# Patient Record
Sex: MALE | Race: BLACK OR AFRICAN AMERICAN | NOT HISPANIC OR LATINO | URBAN - METROPOLITAN AREA
[De-identification: names, ages, dates, MRNs, and addresses within clinical notes are randomized per-mention and may not be internally consistent; named-entity substitution may affect disease eponyms.]

---

## 2021-01-18 ENCOUNTER — HOSPITAL ENCOUNTER (EMERGENCY)
Facility: HOSPITAL | Age: 66
Discharge: HOME | End: 2021-01-18
Attending: EMERGENCY MEDICINE

## 2021-01-18 VITALS
WEIGHT: 160 LBS | BODY MASS INDEX: 22.9 KG/M2 | HEIGHT: 70 IN | OXYGEN SATURATION: 100 % | RESPIRATION RATE: 19 BRPM | DIASTOLIC BLOOD PRESSURE: 97 MMHG | TEMPERATURE: 98 F | SYSTOLIC BLOOD PRESSURE: 188 MMHG | HEART RATE: 61 BPM

## 2021-01-18 DIAGNOSIS — I10 UNCONTROLLED HYPERTENSION: ICD-10-CM

## 2021-01-18 DIAGNOSIS — S29.011A MUSCLE STRAIN OF CHEST WALL, INITIAL ENCOUNTER: Primary | ICD-10-CM

## 2021-01-18 DIAGNOSIS — R07.81 RIB PAIN ON LEFT SIDE: ICD-10-CM

## 2021-01-18 LAB
ALBUMIN SERPL BCP-MCNC: 4.1 G/DL (ref 3.5–5.2)
ALP SERPL-CCNC: 65 U/L (ref 38–126)
ALT SERPL W/O P-5'-P-CCNC: 23 U/L (ref 10–44)
ANION GAP SERPL CALC-SCNC: 7 MMOL/L (ref 8–16)
AST SERPL-CCNC: 32 U/L (ref 15–46)
BASOPHILS # BLD AUTO: 0.02 K/UL (ref 0–0.2)
BASOPHILS NFR BLD: 0.4 % (ref 0–1.9)
BILIRUB SERPL-MCNC: 0.5 MG/DL (ref 0.1–1)
CALCIUM SERPL-MCNC: 9.3 MG/DL (ref 8.7–10.5)
CHLORIDE SERPL-SCNC: 102 MMOL/L (ref 95–110)
CO2 SERPL-SCNC: 33 MMOL/L (ref 23–29)
CREAT SERPL-MCNC: 1.03 MG/DL (ref 0.5–1.4)
DIFFERENTIAL METHOD: ABNORMAL
EOSINOPHIL # BLD AUTO: 0 K/UL (ref 0–0.5)
EOSINOPHIL NFR BLD: 0.9 % (ref 0–8)
ERYTHROCYTE [DISTWIDTH] IN BLOOD BY AUTOMATED COUNT: 14.1 % (ref 11.5–14.5)
EST. GFR  (AFRICAN AMERICAN): >60 ML/MIN/1.73 M^2
EST. GFR  (NON AFRICAN AMERICAN): >60 ML/MIN/1.73 M^2
GLUCOSE SERPL-MCNC: 89 MG/DL (ref 70–110)
HCT VFR BLD AUTO: 42.6 % (ref 40–54)
HGB BLD-MCNC: 13.6 G/DL (ref 14–18)
IMM GRANULOCYTES # BLD AUTO: 0.01 K/UL (ref 0–0.04)
IMM GRANULOCYTES NFR BLD AUTO: 0.2 % (ref 0–0.5)
LYMPHOCYTES # BLD AUTO: 1.8 K/UL (ref 1–4.8)
LYMPHOCYTES NFR BLD: 39.5 % (ref 18–48)
MCH RBC QN AUTO: 27.5 PG (ref 27–31)
MCHC RBC AUTO-ENTMCNC: 31.9 G/DL (ref 32–36)
MCV RBC AUTO: 86 FL (ref 82–98)
MONOCYTES # BLD AUTO: 0.4 K/UL (ref 0.3–1)
MONOCYTES NFR BLD: 8.6 % (ref 4–15)
NEUTROPHILS # BLD AUTO: 2.3 K/UL (ref 1.8–7.7)
NEUTROPHILS NFR BLD: 50.4 % (ref 38–73)
NRBC BLD-RTO: 0 /100 WBC
PLATELET # BLD AUTO: 255 K/UL (ref 150–350)
PMV BLD AUTO: 8.9 FL (ref 9.2–12.9)
POTASSIUM SERPL-SCNC: 3.9 MMOL/L (ref 3.5–5.1)
PROT SERPL-MCNC: 7.5 G/DL (ref 6–8.4)
RBC # BLD AUTO: 4.95 M/UL (ref 4.6–6.2)
SODIUM SERPL-SCNC: 142 MMOL/L (ref 136–145)
TROPONIN I SERPL-MCNC: <0.012 NG/ML (ref 0.01–0.03)
UUN UR-MCNC: 11 MG/DL (ref 2–20)
WBC # BLD AUTO: 4.53 K/UL (ref 3.9–12.7)

## 2021-01-18 PROCEDURE — 84484 ASSAY OF TROPONIN QUANT: CPT | Mod: ER

## 2021-01-18 PROCEDURE — 85025 COMPLETE CBC W/AUTO DIFF WBC: CPT | Mod: ER

## 2021-01-18 PROCEDURE — 80053 COMPREHEN METABOLIC PANEL: CPT | Mod: ER

## 2021-01-18 PROCEDURE — 93010 EKG 12-LEAD: ICD-10-PCS | Mod: ,,, | Performed by: INTERNAL MEDICINE

## 2021-01-18 PROCEDURE — 25000003 PHARM REV CODE 250: Mod: ER | Performed by: PHYSICIAN ASSISTANT

## 2021-01-18 PROCEDURE — 36000 PLACE NEEDLE IN VEIN: CPT | Mod: ER

## 2021-01-18 PROCEDURE — 99285 EMERGENCY DEPT VISIT HI MDM: CPT | Mod: 25,ER

## 2021-01-18 PROCEDURE — 93010 ELECTROCARDIOGRAM REPORT: CPT | Mod: ,,, | Performed by: INTERNAL MEDICINE

## 2021-01-18 PROCEDURE — 93005 ELECTROCARDIOGRAM TRACING: CPT | Mod: ER

## 2021-01-18 RX ORDER — LISINOPRIL 5 MG/1
10 TABLET ORAL
Status: COMPLETED | OUTPATIENT
Start: 2021-01-18 | End: 2021-01-18

## 2021-01-18 RX ORDER — LISINOPRIL 10 MG/1
10 TABLET ORAL DAILY
Status: ON HOLD | COMMUNITY
End: 2022-09-07 | Stop reason: HOSPADM

## 2021-01-18 RX ADMIN — LISINOPRIL 10 MG: 5 TABLET ORAL at 06:01

## 2021-01-21 ENCOUNTER — TELEPHONE (OUTPATIENT)
Dept: ADMINISTRATIVE | Facility: OTHER | Age: 66
End: 2021-01-21

## 2022-09-05 ENCOUNTER — HOSPITAL ENCOUNTER (INPATIENT)
Facility: HOSPITAL | Age: 67
LOS: 1 days | Discharge: HOME | DRG: 246 | End: 2022-09-07
Attending: EMERGENCY MEDICINE | Admitting: FAMILY MEDICINE
Payer: MEDICARE

## 2022-09-05 DIAGNOSIS — R79.89 ELEVATED TROPONIN: ICD-10-CM

## 2022-09-05 DIAGNOSIS — I21.4 NSTEMI (NON-ST ELEVATED MYOCARDIAL INFARCTION): Primary | ICD-10-CM

## 2022-09-05 DIAGNOSIS — E78.2 MIXED HYPERLIPIDEMIA: ICD-10-CM

## 2022-09-05 DIAGNOSIS — I21.11 ST ELEVATION MYOCARDIAL INFARCTION INVOLVING RIGHT CORONARY ARTERY: ICD-10-CM

## 2022-09-05 DIAGNOSIS — I25.10 CAD (CORONARY ARTERY DISEASE): ICD-10-CM

## 2022-09-05 DIAGNOSIS — I21.3 STEMI (ST ELEVATION MYOCARDIAL INFARCTION): ICD-10-CM

## 2022-09-05 DIAGNOSIS — N18.30 CHRONIC RENAL FAILURE, STAGE 3 (MODERATE), UNSPECIFIED WHETHER STAGE 3A OR 3B CKD: ICD-10-CM

## 2022-09-05 DIAGNOSIS — I10 PRIMARY HYPERTENSION: ICD-10-CM

## 2022-09-05 DIAGNOSIS — R11.10 VOMITING: ICD-10-CM

## 2022-09-05 DIAGNOSIS — I25.10 CORONARY ARTERY DISEASE, UNSPECIFIED VESSEL OR LESION TYPE, UNSPECIFIED WHETHER ANGINA PRESENT, UNSPECIFIED WHETHER NATIVE OR TRANSPLANTED HEART: ICD-10-CM

## 2022-09-05 DIAGNOSIS — I24.9 ACUTE CORONARY SYNDROME: ICD-10-CM

## 2022-09-05 DIAGNOSIS — R11.10 VOMITING, INTRACTABILITY OF VOMITING NOT SPECIFIED, PRESENCE OF NAUSEA NOT SPECIFIED, UNSPECIFIED VOMITING TYPE: ICD-10-CM

## 2022-09-05 DIAGNOSIS — I21.9: ICD-10-CM

## 2022-09-05 PROCEDURE — 96361 HYDRATE IV INFUSION ADD-ON: CPT | Mod: ER

## 2022-09-05 PROCEDURE — 85025 COMPLETE CBC W/AUTO DIFF WBC: CPT | Mod: ER | Performed by: EMERGENCY MEDICINE

## 2022-09-05 PROCEDURE — 25000003 PHARM REV CODE 250: Mod: ER | Performed by: EMERGENCY MEDICINE

## 2022-09-05 PROCEDURE — 63600175 PHARM REV CODE 636 W HCPCS: Mod: ER | Performed by: EMERGENCY MEDICINE

## 2022-09-05 PROCEDURE — 99291 CRITICAL CARE FIRST HOUR: CPT | Mod: 25,ER

## 2022-09-05 PROCEDURE — 84484 ASSAY OF TROPONIN QUANT: CPT | Mod: ER | Performed by: EMERGENCY MEDICINE

## 2022-09-05 PROCEDURE — 85610 PROTHROMBIN TIME: CPT | Mod: ER | Performed by: EMERGENCY MEDICINE

## 2022-09-05 PROCEDURE — 80053 COMPREHEN METABOLIC PANEL: CPT | Mod: ER | Performed by: EMERGENCY MEDICINE

## 2022-09-05 PROCEDURE — 85730 THROMBOPLASTIN TIME PARTIAL: CPT | Mod: ER | Performed by: EMERGENCY MEDICINE

## 2022-09-05 PROCEDURE — 83690 ASSAY OF LIPASE: CPT | Mod: ER | Performed by: EMERGENCY MEDICINE

## 2022-09-05 PROCEDURE — 96375 TX/PRO/DX INJ NEW DRUG ADDON: CPT | Mod: ER

## 2022-09-05 RX ORDER — ONDANSETRON 2 MG/ML
4 INJECTION INTRAMUSCULAR; INTRAVENOUS
Status: COMPLETED | OUTPATIENT
Start: 2022-09-05 | End: 2022-09-05

## 2022-09-05 RX ORDER — KETOROLAC TROMETHAMINE 30 MG/ML
15 INJECTION, SOLUTION INTRAMUSCULAR; INTRAVENOUS
Status: COMPLETED | OUTPATIENT
Start: 2022-09-05 | End: 2022-09-05

## 2022-09-05 RX ADMIN — KETOROLAC TROMETHAMINE 15 MG: 30 INJECTION, SOLUTION INTRAMUSCULAR; INTRAVENOUS at 11:09

## 2022-09-05 RX ADMIN — ONDANSETRON 4 MG: 2 INJECTION INTRAMUSCULAR; INTRAVENOUS at 11:09

## 2022-09-05 RX ADMIN — SODIUM CHLORIDE 1000 ML: 0.9 INJECTION, SOLUTION INTRAVENOUS at 11:09

## 2022-09-05 NOTE — Clinical Note
100 ml of contrast were injected throughout the case. 20 mL of contrast was the total wasted during the case. 120 mL was the total amount used during the case.

## 2022-09-05 NOTE — Clinical Note
The catheter was reinserted into the proximal   right coronary artery. IVUS performed, IVUS catheter removed.

## 2022-09-05 NOTE — Clinical Note
125 ml of contrast were injected throughout the case. 75 mL of contrast was the total wasted during the case. 200 mL was the total amount used during the case.

## 2022-09-05 NOTE — Clinical Note
The catheter was inserted into the ostium   right coronary artery. An angiography was performed of the right coronary arteries. Multiple views were taken.

## 2022-09-05 NOTE — Clinical Note
A percutaneous stick to the right radial artery was performed. Ultrasound guidance was used to obtain access.

## 2022-09-05 NOTE — Clinical Note
The catheter was inserted into the ostium   right coronary artery. An angiography was performed of the right coronary arteries.

## 2022-09-05 NOTE — Clinical Note
The right groin and right radial was prepped. The site was prepped with ChloraPrep. The site was clipped. The patient was draped. The patient was positioned supine.

## 2022-09-06 PROBLEM — I21.4 NSTEMI (NON-ST ELEVATED MYOCARDIAL INFARCTION): Status: ACTIVE | Noted: 2022-09-06

## 2022-09-06 PROBLEM — I24.9 ACUTE CORONARY SYNDROME: Status: ACTIVE | Noted: 2022-09-06

## 2022-09-06 PROBLEM — I10 PRIMARY HYPERTENSION: Status: ACTIVE | Noted: 2022-09-06

## 2022-09-06 PROBLEM — N18.30 CHRONIC RENAL FAILURE, STAGE 3 (MODERATE): Status: ACTIVE | Noted: 2022-09-06

## 2022-09-06 PROBLEM — R11.10 VOMITING: Status: ACTIVE | Noted: 2022-09-06

## 2022-09-06 PROBLEM — I21.3 STEMI (ST ELEVATION MYOCARDIAL INFARCTION): Status: ACTIVE | Noted: 2022-09-06

## 2022-09-06 PROBLEM — R79.89 ELEVATED TROPONIN: Status: ACTIVE | Noted: 2022-09-06

## 2022-09-06 PROBLEM — E78.2 MIXED HYPERLIPIDEMIA: Status: ACTIVE | Noted: 2022-09-06

## 2022-09-06 PROBLEM — I25.10 CORONARY ARTERY DISEASE: Status: ACTIVE | Noted: 2022-09-06

## 2022-09-06 LAB
ALBUMIN SERPL BCP-MCNC: 3.4 G/DL (ref 3.5–5.2)
ALBUMIN SERPL BCP-MCNC: 4.2 G/DL (ref 3.5–5.2)
ALP SERPL-CCNC: 50 U/L (ref 55–135)
ALP SERPL-CCNC: 57 U/L (ref 38–126)
ALT SERPL W/O P-5'-P-CCNC: 55 U/L (ref 10–44)
ALT SERPL W/O P-5'-P-CCNC: 59 U/L (ref 10–44)
ANION GAP SERPL CALC-SCNC: 11 MMOL/L (ref 8–16)
ANION GAP SERPL CALC-SCNC: 7 MMOL/L (ref 8–16)
AORTIC ROOT ANNULUS: 3.35 CM
AORTIC VALVE CUSP SEPERATION: 2.08 CM
APTT BLDCRRT: 26.9 SEC (ref 21–32)
APTT BLDCRRT: 49.4 SEC (ref 21–32)
AST SERPL-CCNC: 267 U/L (ref 10–40)
AST SERPL-CCNC: 356 U/L (ref 15–46)
AV INDEX (PROSTH): 1.03
AV MEAN GRADIENT: 3 MMHG
AV PEAK GRADIENT: 5 MMHG
AV VALVE AREA: 3.17 CM2
AV VELOCITY RATIO: 0.99
BACTERIA #/AREA URNS HPF: NORMAL /HPF
BASOPHILS # BLD AUTO: 0.01 K/UL (ref 0–0.2)
BASOPHILS # BLD AUTO: 0.01 K/UL (ref 0–0.2)
BASOPHILS NFR BLD: 0.1 % (ref 0–1.9)
BASOPHILS NFR BLD: 0.1 % (ref 0–1.9)
BILIRUB SERPL-MCNC: 0.9 MG/DL (ref 0.1–1)
BILIRUB SERPL-MCNC: 1 MG/DL (ref 0.1–1)
BILIRUB UR QL STRIP: NEGATIVE
BNP SERPL-MCNC: 78 PG/ML (ref 0–99)
BSA FOR ECHO PROCEDURE: 1.89 M2
BUN SERPL-MCNC: 23 MG/DL (ref 8–23)
CALCIUM SERPL-MCNC: 8.8 MG/DL (ref 8.7–10.5)
CALCIUM SERPL-MCNC: 9.1 MG/DL (ref 8.7–10.5)
CHLORIDE SERPL-SCNC: 93 MMOL/L (ref 95–110)
CHLORIDE SERPL-SCNC: 98 MMOL/L (ref 95–110)
CHOLEST SERPL-MCNC: 165 MG/DL (ref 120–199)
CHOLEST/HDLC SERPL: 3 {RATIO} (ref 2–5)
CK MB SERPL-MCNC: 220.5 NG/ML (ref 0.1–6.5)
CK MB SERPL-RTO: 11.9 % (ref 0–5)
CK SERPL-CCNC: 1848 U/L (ref 20–200)
CLARITY UR: CLEAR
CO2 SERPL-SCNC: 25 MMOL/L (ref 23–29)
CO2 SERPL-SCNC: 32 MMOL/L (ref 23–29)
COLOR UR: YELLOW
CREAT SERPL-MCNC: 1.5 MG/DL (ref 0.5–1.4)
CREAT SERPL-MCNC: 1.64 MG/DL (ref 0.5–1.4)
CV ECHO LV RWT: 0.37 CM
DIFFERENTIAL METHOD: ABNORMAL
DIFFERENTIAL METHOD: ABNORMAL
DOP CALC AO PEAK VEL: 1.07 M/S
DOP CALC AO VTI: 18.91 CM
DOP CALC LVOT AREA: 3.1 CM2
DOP CALC LVOT DIAMETER: 1.98 CM
DOP CALC LVOT PEAK VEL: 1.06 M/S
DOP CALC LVOT STROKE VOLUME: 59.98 CM3
DOP CALC MV VTI: 29.44 CM
DOP CALCLVOT PEAK VEL VTI: 19.49 CM
E WAVE DECELERATION TIME: 254.36 MSEC
E/A RATIO: 0.69
E/E' RATIO: 7.18 M/S
ECHO LV POSTERIOR WALL: 0.82 CM (ref 0.6–1.1)
EJECTION FRACTION: 45 %
EOSINOPHIL # BLD AUTO: 0 K/UL (ref 0–0.5)
EOSINOPHIL # BLD AUTO: 0 K/UL (ref 0–0.5)
EOSINOPHIL NFR BLD: 0.1 % (ref 0–8)
EOSINOPHIL NFR BLD: 0.1 % (ref 0–8)
ERYTHROCYTE [DISTWIDTH] IN BLOOD BY AUTOMATED COUNT: 13.6 % (ref 11.5–14.5)
ERYTHROCYTE [DISTWIDTH] IN BLOOD BY AUTOMATED COUNT: 13.9 % (ref 11.5–14.5)
EST. GFR  (NO RACE VARIABLE): 45 ML/MIN/1.73 M^2
EST. GFR  (NO RACE VARIABLE): 50 ML/MIN/1.73 M^2
ESTIMATED AVG GLUCOSE: 108 MG/DL (ref 68–131)
FRACTIONAL SHORTENING: 20 % (ref 28–44)
GLUCOSE SERPL-MCNC: 118 MG/DL (ref 70–110)
GLUCOSE SERPL-MCNC: 140 MG/DL (ref 70–110)
GLUCOSE UR QL STRIP: NEGATIVE
HBA1C MFR BLD: 5.4 % (ref 4–5.6)
HCT VFR BLD AUTO: 42.4 % (ref 40–54)
HCT VFR BLD AUTO: 43.3 % (ref 40–54)
HDLC SERPL-MCNC: 55 MG/DL (ref 40–75)
HDLC SERPL: 33.3 % (ref 20–50)
HGB BLD-MCNC: 13.5 G/DL (ref 14–18)
HGB BLD-MCNC: 14 G/DL (ref 14–18)
HGB UR QL STRIP: NEGATIVE
IMM GRANULOCYTES # BLD AUTO: 0.02 K/UL (ref 0–0.04)
IMM GRANULOCYTES # BLD AUTO: 0.02 K/UL (ref 0–0.04)
IMM GRANULOCYTES NFR BLD AUTO: 0.2 % (ref 0–0.5)
IMM GRANULOCYTES NFR BLD AUTO: 0.2 % (ref 0–0.5)
INR PPP: 1.1 (ref 0.8–1.2)
INTERVENTRICULAR SEPTUM: 1.25 CM (ref 0.6–1.1)
IVRT: 91.34 MSEC
KETONES UR QL STRIP: NEGATIVE
LA MAJOR: 4.9 CM
LA MINOR: 5.84 CM
LA WIDTH: 3.5 CM
LDLC SERPL CALC-MCNC: 99.2 MG/DL (ref 63–159)
LEFT ATRIUM SIZE: 2.84 CM
LEFT ATRIUM VOLUME INDEX MOD: 15.8 ML/M2
LEFT ATRIUM VOLUME INDEX: 23.7 ML/M2
LEFT ATRIUM VOLUME MOD: 30.09 CM3
LEFT ATRIUM VOLUME: 45.02 CM3
LEFT INTERNAL DIMENSION IN SYSTOLE: 3.54 CM (ref 2.1–4)
LEFT VENTRICLE DIASTOLIC VOLUME INDEX: 46.45 ML/M2
LEFT VENTRICLE DIASTOLIC VOLUME: 88.26 ML
LEFT VENTRICLE MASS INDEX: 82 G/M2
LEFT VENTRICLE SYSTOLIC VOLUME INDEX: 27.4 ML/M2
LEFT VENTRICLE SYSTOLIC VOLUME: 52.14 ML
LEFT VENTRICULAR INTERNAL DIMENSION IN DIASTOLE: 4.41 CM (ref 3.5–6)
LEFT VENTRICULAR MASS: 155.63 G
LEUKOCYTE ESTERASE UR QL STRIP: ABNORMAL
LIPASE SERPL-CCNC: 35 U/L (ref 23–300)
LV LATERAL E/E' RATIO: 5.55 M/S
LV SEPTAL E/E' RATIO: 10.17 M/S
LYMPHOCYTES # BLD AUTO: 1 K/UL (ref 1–4.8)
LYMPHOCYTES # BLD AUTO: 1.5 K/UL (ref 1–4.8)
LYMPHOCYTES NFR BLD: 12 % (ref 18–48)
LYMPHOCYTES NFR BLD: 14.2 % (ref 18–48)
MAGNESIUM SERPL-MCNC: 2.4 MG/DL (ref 1.6–2.6)
MCH RBC QN AUTO: 27.1 PG (ref 27–31)
MCH RBC QN AUTO: 27.3 PG (ref 27–31)
MCHC RBC AUTO-ENTMCNC: 31.8 G/DL (ref 32–36)
MCHC RBC AUTO-ENTMCNC: 32.3 G/DL (ref 32–36)
MCV RBC AUTO: 85 FL (ref 82–98)
MCV RBC AUTO: 85 FL (ref 82–98)
MICROSCOPIC COMMENT: NORMAL
MONOCYTES # BLD AUTO: 0.7 K/UL (ref 0.3–1)
MONOCYTES # BLD AUTO: 0.9 K/UL (ref 0.3–1)
MONOCYTES NFR BLD: 7.8 % (ref 4–15)
MONOCYTES NFR BLD: 8.7 % (ref 4–15)
MV A" WAVE DURATION": 14.46 MSEC
MV MEAN GRADIENT: 0 MMHG
MV PEAK A VEL: 0.88 M/S
MV PEAK E VEL: 0.61 M/S
MV PEAK GRADIENT: 3 MMHG
MV STENOSIS PRESSURE HALF TIME: 73.77 MS
MV VALVE AREA BY CONTINUITY EQUATION: 2.04 CM2
MV VALVE AREA P 1/2 METHOD: 2.98 CM2
NEUTROPHILS # BLD AUTO: 6.8 K/UL (ref 1.8–7.7)
NEUTROPHILS # BLD AUTO: 8 K/UL (ref 1.8–7.7)
NEUTROPHILS NFR BLD: 76.7 % (ref 38–73)
NEUTROPHILS NFR BLD: 79.8 % (ref 38–73)
NITRITE UR QL STRIP: NEGATIVE
NONHDLC SERPL-MCNC: 110 MG/DL
NRBC BLD-RTO: 0 /100 WBC
NRBC BLD-RTO: 0 /100 WBC
PH UR STRIP: 6 [PH] (ref 5–8)
PHOSPHATE SERPL-MCNC: 2.8 MG/DL (ref 2.7–4.5)
PISA TR MAX VEL: 2.32 M/S
PLATELET # BLD AUTO: 246 K/UL (ref 150–450)
PLATELET # BLD AUTO: 254 K/UL (ref 150–450)
PMV BLD AUTO: 9.7 FL (ref 9.2–12.9)
PMV BLD AUTO: 9.8 FL (ref 9.2–12.9)
POC ACTIVATED CLOTTING TIME K: 161 SEC (ref 74–137)
POC ACTIVATED CLOTTING TIME K: 248 SEC (ref 74–137)
POC ACTIVATED CLOTTING TIME K: 265 SEC (ref 74–137)
POC ACTIVATED CLOTTING TIME K: 422 SEC (ref 74–137)
POCT GLUCOSE: 104 MG/DL (ref 70–110)
POCT GLUCOSE: 77 MG/DL (ref 70–110)
POCT GLUCOSE: 77 MG/DL (ref 70–110)
POCT GLUCOSE: 94 MG/DL (ref 70–110)
POTASSIUM SERPL-SCNC: 3.7 MMOL/L (ref 3.5–5.1)
POTASSIUM SERPL-SCNC: 4.1 MMOL/L (ref 3.5–5.1)
PROT SERPL-MCNC: 6.6 G/DL (ref 6–8.4)
PROT SERPL-MCNC: 7 G/DL (ref 6–8.4)
PROT UR QL STRIP: NEGATIVE
PROTHROMBIN TIME: 11.6 SEC (ref 9–12.5)
PULM VEIN S/D RATIO: 1.27
PV PEAK D VEL: 0.26 M/S
PV PEAK S VEL: 0.33 M/S
PV PEAK VELOCITY: 0.79 CM/S
RA MAJOR: 5.33 CM
RA PRESSURE: 3 MMHG
RA WIDTH: 3.92 CM
RBC # BLD AUTO: 4.98 M/UL (ref 4.6–6.2)
RBC # BLD AUTO: 5.12 M/UL (ref 4.6–6.2)
RBC #/AREA URNS HPF: 0 /HPF (ref 0–4)
RIGHT VENTRICULAR END-DIASTOLIC DIMENSION: 2.84 CM
SAMPLE: ABNORMAL
SARS-COV-2 RDRP RESP QL NAA+PROBE: NEGATIVE
SODIUM SERPL-SCNC: 132 MMOL/L (ref 136–145)
SODIUM SERPL-SCNC: 134 MMOL/L (ref 136–145)
SP GR UR STRIP: 1.01 (ref 1–1.03)
SQUAMOUS #/AREA URNS HPF: 0 /HPF
TDI LATERAL: 0.11 M/S
TDI SEPTAL: 0.06 M/S
TDI: 0.09 M/S
TR MAX PG: 22 MMHG
TRICUSPID ANNULAR PLANE SYSTOLIC EXCURSION: 1.44 CM
TRIGL SERPL-MCNC: 54 MG/DL (ref 30–150)
TROPONIN I SERPL DL<=0.01 NG/ML-MCNC: 25.86 NG/ML (ref 0–0.03)
TROPONIN I SERPL-MCNC: 20.5 NG/ML (ref 0.01–0.03)
TV REST PULMONARY ARTERY PRESSURE: 25 MMHG
URN SPEC COLLECT METH UR: ABNORMAL
UROBILINOGEN UR STRIP-ACNC: NEGATIVE EU/DL
UUN UR-MCNC: 24 MG/DL (ref 2–20)
WBC # BLD AUTO: 10.45 K/UL (ref 3.9–12.7)
WBC # BLD AUTO: 8.56 K/UL (ref 3.9–12.7)
WBC #/AREA URNS HPF: 5 /HPF (ref 0–5)

## 2022-09-06 PROCEDURE — 81000 URINALYSIS NONAUTO W/SCOPE: CPT | Performed by: STUDENT IN AN ORGANIZED HEALTH CARE EDUCATION/TRAINING PROGRAM

## 2022-09-06 PROCEDURE — 27000221 HC OXYGEN, UP TO 24 HOURS

## 2022-09-06 PROCEDURE — 83735 ASSAY OF MAGNESIUM: CPT | Performed by: STUDENT IN AN ORGANIZED HEALTH CARE EDUCATION/TRAINING PROGRAM

## 2022-09-06 PROCEDURE — 25000003 PHARM REV CODE 250: Performed by: STUDENT IN AN ORGANIZED HEALTH CARE EDUCATION/TRAINING PROGRAM

## 2022-09-06 PROCEDURE — 99900035 HC TECH TIME PER 15 MIN (STAT): Mod: ER

## 2022-09-06 PROCEDURE — 25000003 PHARM REV CODE 250: Performed by: INTERNAL MEDICINE

## 2022-09-06 PROCEDURE — C1753 CATH, INTRAVAS ULTRASOUND: HCPCS | Performed by: INTERNAL MEDICINE

## 2022-09-06 PROCEDURE — 92978 PR IVUS, CORONARY, 1ST VESSEL: ICD-10-PCS | Mod: 26,RC,, | Performed by: INTERNAL MEDICINE

## 2022-09-06 PROCEDURE — 83036 HEMOGLOBIN GLYCOSYLATED A1C: CPT | Performed by: STUDENT IN AN ORGANIZED HEALTH CARE EDUCATION/TRAINING PROGRAM

## 2022-09-06 PROCEDURE — 93010 ELECTROCARDIOGRAM REPORT: CPT | Mod: ,,, | Performed by: INTERNAL MEDICINE

## 2022-09-06 PROCEDURE — 83880 ASSAY OF NATRIURETIC PEPTIDE: CPT | Performed by: FAMILY MEDICINE

## 2022-09-06 PROCEDURE — 99152 MOD SED SAME PHYS/QHP 5/>YRS: CPT | Performed by: INTERNAL MEDICINE

## 2022-09-06 PROCEDURE — 99153 MOD SED SAME PHYS/QHP EA: CPT | Performed by: INTERNAL MEDICINE

## 2022-09-06 PROCEDURE — 93458 L HRT ARTERY/VENTRICLE ANGIO: CPT | Mod: XU | Performed by: INTERNAL MEDICINE

## 2022-09-06 PROCEDURE — 25500020 PHARM REV CODE 255: Performed by: INTERNAL MEDICINE

## 2022-09-06 PROCEDURE — 84484 ASSAY OF TROPONIN QUANT: CPT | Performed by: FAMILY MEDICINE

## 2022-09-06 PROCEDURE — U0002 COVID-19 LAB TEST NON-CDC: HCPCS | Mod: ER | Performed by: EMERGENCY MEDICINE

## 2022-09-06 PROCEDURE — 93005 ELECTROCARDIOGRAM TRACING: CPT

## 2022-09-06 PROCEDURE — C1769 GUIDE WIRE: HCPCS | Performed by: INTERNAL MEDICINE

## 2022-09-06 PROCEDURE — C1874 STENT, COATED/COV W/DEL SYS: HCPCS | Performed by: INTERNAL MEDICINE

## 2022-09-06 PROCEDURE — C9606 PERC D-E COR REVASC W AMI S: HCPCS | Mod: RC | Performed by: INTERNAL MEDICINE

## 2022-09-06 PROCEDURE — 99152 MOD SED SAME PHYS/QHP 5/>YRS: CPT | Mod: ,,, | Performed by: INTERNAL MEDICINE

## 2022-09-06 PROCEDURE — 99152 PR MOD CONSCIOUS SEDATION, SAME PHYS, 5+ YRS, FIRST 15 MIN: ICD-10-PCS | Mod: ,,, | Performed by: INTERNAL MEDICINE

## 2022-09-06 PROCEDURE — C1887 CATHETER, GUIDING: HCPCS | Performed by: INTERNAL MEDICINE

## 2022-09-06 PROCEDURE — C1725 CATH, TRANSLUMIN NON-LASER: HCPCS | Performed by: INTERNAL MEDICINE

## 2022-09-06 PROCEDURE — 84100 ASSAY OF PHOSPHORUS: CPT | Performed by: STUDENT IN AN ORGANIZED HEALTH CARE EDUCATION/TRAINING PROGRAM

## 2022-09-06 PROCEDURE — 92941 PRQ TRLML REVSC TOT OCCL AMI: CPT | Mod: RC,,, | Performed by: INTERNAL MEDICINE

## 2022-09-06 PROCEDURE — 63600175 PHARM REV CODE 636 W HCPCS: Mod: ER | Performed by: EMERGENCY MEDICINE

## 2022-09-06 PROCEDURE — 85347 COAGULATION TIME ACTIVATED: CPT | Performed by: INTERNAL MEDICINE

## 2022-09-06 PROCEDURE — 25000003 PHARM REV CODE 250: Performed by: FAMILY MEDICINE

## 2022-09-06 PROCEDURE — 93458 PR CATH PLACE/CORON ANGIO, IMG SUPER/INTERP,W LEFT HEART VENTRICULOGRAPHY: ICD-10-PCS | Mod: 26,59,51, | Performed by: INTERNAL MEDICINE

## 2022-09-06 PROCEDURE — 36415 COLL VENOUS BLD VENIPUNCTURE: CPT | Performed by: STUDENT IN AN ORGANIZED HEALTH CARE EDUCATION/TRAINING PROGRAM

## 2022-09-06 PROCEDURE — 96365 THER/PROPH/DIAG IV INF INIT: CPT | Mod: ER

## 2022-09-06 PROCEDURE — 99291 CRITICAL CARE FIRST HOUR: CPT | Mod: ,,, | Performed by: INTERNAL MEDICINE

## 2022-09-06 PROCEDURE — 82553 CREATINE MB FRACTION: CPT | Performed by: FAMILY MEDICINE

## 2022-09-06 PROCEDURE — 27100171 HC OXYGEN HIGH FLOW UP TO 24 HOURS

## 2022-09-06 PROCEDURE — C1757 CATH, THROMBECTOMY/EMBOLECT: HCPCS | Performed by: INTERNAL MEDICINE

## 2022-09-06 PROCEDURE — 80061 LIPID PANEL: CPT | Performed by: STUDENT IN AN ORGANIZED HEALTH CARE EDUCATION/TRAINING PROGRAM

## 2022-09-06 PROCEDURE — 92978 ENDOLUMINL IVUS OCT C 1ST: CPT | Mod: 26,RC,, | Performed by: INTERNAL MEDICINE

## 2022-09-06 PROCEDURE — 20000000 HC ICU ROOM

## 2022-09-06 PROCEDURE — 85025 COMPLETE CBC W/AUTO DIFF WBC: CPT | Performed by: EMERGENCY MEDICINE

## 2022-09-06 PROCEDURE — 25000003 PHARM REV CODE 250: Mod: ER | Performed by: EMERGENCY MEDICINE

## 2022-09-06 PROCEDURE — 85730 THROMBOPLASTIN TIME PARTIAL: CPT | Performed by: EMERGENCY MEDICINE

## 2022-09-06 PROCEDURE — 63600175 PHARM REV CODE 636 W HCPCS: Performed by: INTERNAL MEDICINE

## 2022-09-06 PROCEDURE — 93458 L HRT ARTERY/VENTRICLE ANGIO: CPT | Mod: 26,59,51, | Performed by: INTERNAL MEDICINE

## 2022-09-06 PROCEDURE — 93010 ELECTROCARDIOGRAM REPORT: CPT | Mod: 76,,, | Performed by: INTERNAL MEDICINE

## 2022-09-06 PROCEDURE — 63600175 PHARM REV CODE 636 W HCPCS: Mod: JG | Performed by: INTERNAL MEDICINE

## 2022-09-06 PROCEDURE — 94761 N-INVAS EAR/PLS OXIMETRY MLT: CPT

## 2022-09-06 PROCEDURE — 93010 EKG 12-LEAD: ICD-10-PCS | Mod: ,,, | Performed by: INTERNAL MEDICINE

## 2022-09-06 PROCEDURE — 92941 PR AMI ANY METHOD: ICD-10-PCS | Mod: RC,,, | Performed by: INTERNAL MEDICINE

## 2022-09-06 PROCEDURE — 27201423 OPTIME MED/SURG SUP & DEVICES STERILE SUPPLY: Performed by: INTERNAL MEDICINE

## 2022-09-06 PROCEDURE — 80053 COMPREHEN METABOLIC PANEL: CPT | Performed by: STUDENT IN AN ORGANIZED HEALTH CARE EDUCATION/TRAINING PROGRAM

## 2022-09-06 PROCEDURE — 99900035 HC TECH TIME PER 15 MIN (STAT)

## 2022-09-06 PROCEDURE — 93005 ELECTROCARDIOGRAM TRACING: CPT | Mod: ER

## 2022-09-06 PROCEDURE — 92978 ENDOLUMINL IVUS OCT C 1ST: CPT | Mod: RC | Performed by: INTERNAL MEDICINE

## 2022-09-06 PROCEDURE — 25000003 PHARM REV CODE 250: Performed by: NURSE PRACTITIONER

## 2022-09-06 PROCEDURE — 63600175 PHARM REV CODE 636 W HCPCS: Performed by: EMERGENCY MEDICINE

## 2022-09-06 PROCEDURE — 99291 PR CRITICAL CARE, E/M 30-74 MINUTES: ICD-10-PCS | Mod: ,,, | Performed by: INTERNAL MEDICINE

## 2022-09-06 PROCEDURE — C1894 INTRO/SHEATH, NON-LASER: HCPCS | Performed by: INTERNAL MEDICINE

## 2022-09-06 DEVICE — STENT RONYX35030UX RESOLUTE ONYX 3.50X30
Type: IMPLANTABLE DEVICE | Site: HEART | Status: FUNCTIONAL
Brand: RESOLUTE ONYX™

## 2022-09-06 RX ORDER — CLOPIDOGREL BISULFATE 75 MG/1
600 TABLET ORAL ONCE
Status: DISCONTINUED | OUTPATIENT
Start: 2022-09-06 | End: 2022-09-06

## 2022-09-06 RX ORDER — IBUPROFEN 200 MG
16 TABLET ORAL
Status: DISCONTINUED | OUTPATIENT
Start: 2022-09-06 | End: 2022-09-06

## 2022-09-06 RX ORDER — CLOPIDOGREL BISULFATE 75 MG/1
600 TABLET ORAL DAILY
Status: DISCONTINUED | OUTPATIENT
Start: 2022-09-06 | End: 2022-09-06

## 2022-09-06 RX ORDER — ATORVASTATIN CALCIUM 10 MG/1
10 TABLET, FILM COATED ORAL DAILY
Status: DISCONTINUED | OUTPATIENT
Start: 2022-09-06 | End: 2022-09-06

## 2022-09-06 RX ORDER — ATORVASTATIN CALCIUM 10 MG/1
10 TABLET, FILM COATED ORAL DAILY
Qty: 90 TABLET | Refills: 3 | Status: SHIPPED | OUTPATIENT
Start: 2022-09-06 | End: 2022-09-07 | Stop reason: HOSPADM

## 2022-09-06 RX ORDER — ATORVASTATIN CALCIUM 40 MG/1
80 TABLET, FILM COATED ORAL DAILY
Status: DISCONTINUED | OUTPATIENT
Start: 2022-09-06 | End: 2022-09-07 | Stop reason: HOSPADM

## 2022-09-06 RX ORDER — IBUPROFEN 200 MG
24 TABLET ORAL
Status: DISCONTINUED | OUTPATIENT
Start: 2022-09-06 | End: 2022-09-06

## 2022-09-06 RX ORDER — IODIXANOL 320 MG/ML
INJECTION, SOLUTION INTRAVASCULAR
Status: DISCONTINUED | OUTPATIENT
Start: 2022-09-06 | End: 2022-09-06 | Stop reason: HOSPADM

## 2022-09-06 RX ORDER — ACETAMINOPHEN 325 MG/1
650 TABLET ORAL EVERY 4 HOURS PRN
Status: DISCONTINUED | OUTPATIENT
Start: 2022-09-06 | End: 2022-09-06 | Stop reason: SDUPTHER

## 2022-09-06 RX ORDER — MIDAZOLAM HYDROCHLORIDE 1 MG/ML
INJECTION, SOLUTION INTRAMUSCULAR; INTRAVENOUS
Status: DISCONTINUED | OUTPATIENT
Start: 2022-09-06 | End: 2022-09-06 | Stop reason: HOSPADM

## 2022-09-06 RX ORDER — INSULIN ASPART 100 [IU]/ML
1-10 INJECTION, SOLUTION INTRAVENOUS; SUBCUTANEOUS
Status: DISCONTINUED | OUTPATIENT
Start: 2022-09-06 | End: 2022-09-06

## 2022-09-06 RX ORDER — TIROFIBAN HYDROCHLORIDE 50 UG/ML
INJECTION INTRAVENOUS
Status: DISCONTINUED | OUTPATIENT
Start: 2022-09-06 | End: 2022-09-07 | Stop reason: HOSPADM

## 2022-09-06 RX ORDER — ONDANSETRON 8 MG/1
8 TABLET, ORALLY DISINTEGRATING ORAL EVERY 8 HOURS PRN
Status: DISCONTINUED | OUTPATIENT
Start: 2022-09-06 | End: 2022-09-07 | Stop reason: HOSPADM

## 2022-09-06 RX ORDER — SODIUM CHLORIDE 9 MG/ML
INJECTION, SOLUTION INTRAVENOUS CONTINUOUS
Status: ACTIVE | OUTPATIENT
Start: 2022-09-06 | End: 2022-09-06

## 2022-09-06 RX ORDER — TALC
9 POWDER (GRAM) TOPICAL NIGHTLY PRN
Status: DISCONTINUED | OUTPATIENT
Start: 2022-09-06 | End: 2022-09-07 | Stop reason: HOSPADM

## 2022-09-06 RX ORDER — FENTANYL CITRATE 50 UG/ML
INJECTION, SOLUTION INTRAMUSCULAR; INTRAVENOUS
Status: DISCONTINUED | OUTPATIENT
Start: 2022-09-06 | End: 2022-09-06 | Stop reason: HOSPADM

## 2022-09-06 RX ORDER — TIROFIBAN HYDROCHLORIDE 50 UG/ML
0.07 INJECTION INTRAVENOUS CONTINUOUS
Status: ACTIVE | OUTPATIENT
Start: 2022-09-06 | End: 2022-09-07

## 2022-09-06 RX ORDER — LIDOCAINE HYDROCHLORIDE 10 MG/ML
INJECTION, SOLUTION EPIDURAL; INFILTRATION; INTRACAUDAL; PERINEURAL
Status: DISCONTINUED | OUTPATIENT
Start: 2022-09-06 | End: 2022-09-06 | Stop reason: HOSPADM

## 2022-09-06 RX ORDER — MORPHINE SULFATE 4 MG/ML
4 INJECTION, SOLUTION INTRAMUSCULAR; INTRAVENOUS EVERY 4 HOURS PRN
Status: DISCONTINUED | OUTPATIENT
Start: 2022-09-06 | End: 2022-09-07 | Stop reason: HOSPADM

## 2022-09-06 RX ORDER — HEPARIN SODIUM 200 [USP'U]/100ML
INJECTION, SOLUTION INTRAVENOUS
Status: DISCONTINUED | OUTPATIENT
Start: 2022-09-06 | End: 2022-09-06

## 2022-09-06 RX ORDER — HEPARIN SODIUM,PORCINE/D5W 25000/250
0-24 INTRAVENOUS SOLUTION INTRAVENOUS CONTINUOUS
Status: DISCONTINUED | OUTPATIENT
Start: 2022-09-06 | End: 2022-09-06

## 2022-09-06 RX ORDER — ASPIRIN 325 MG
325 TABLET ORAL
Status: COMPLETED | OUTPATIENT
Start: 2022-09-06 | End: 2022-09-06

## 2022-09-06 RX ORDER — GLUCAGON 1 MG
1 KIT INJECTION
Status: DISCONTINUED | OUTPATIENT
Start: 2022-09-06 | End: 2022-09-06

## 2022-09-06 RX ORDER — CLOPIDOGREL BISULFATE 75 MG/1
600 TABLET ORAL ONCE
Status: COMPLETED | OUTPATIENT
Start: 2022-09-06 | End: 2022-09-06

## 2022-09-06 RX ORDER — SODIUM CHLORIDE 0.9 % (FLUSH) 0.9 %
5 SYRINGE (ML) INJECTION
Status: DISCONTINUED | OUTPATIENT
Start: 2022-09-06 | End: 2022-09-07 | Stop reason: HOSPADM

## 2022-09-06 RX ORDER — ACETAMINOPHEN 325 MG/1
650 TABLET ORAL EVERY 4 HOURS PRN
Status: DISCONTINUED | OUTPATIENT
Start: 2022-09-06 | End: 2022-09-07 | Stop reason: HOSPADM

## 2022-09-06 RX ORDER — AMOXICILLIN 250 MG
1 CAPSULE ORAL 2 TIMES DAILY
Status: DISCONTINUED | OUTPATIENT
Start: 2022-09-06 | End: 2022-09-07 | Stop reason: HOSPADM

## 2022-09-06 RX ORDER — METOPROLOL TARTRATE 25 MG/1
25 TABLET, FILM COATED ORAL 2 TIMES DAILY
Status: DISCONTINUED | OUTPATIENT
Start: 2022-09-07 | End: 2022-09-07

## 2022-09-06 RX ORDER — HEPARIN SODIUM 1000 [USP'U]/ML
INJECTION, SOLUTION INTRAVENOUS; SUBCUTANEOUS
Status: DISCONTINUED | OUTPATIENT
Start: 2022-09-06 | End: 2022-09-06 | Stop reason: HOSPADM

## 2022-09-06 RX ORDER — LISINOPRIL 10 MG/1
10 TABLET ORAL DAILY
Status: DISCONTINUED | OUTPATIENT
Start: 2022-09-06 | End: 2022-09-07 | Stop reason: HOSPADM

## 2022-09-06 RX ORDER — NALOXONE HCL 0.4 MG/ML
0.02 VIAL (ML) INJECTION
Status: DISCONTINUED | OUTPATIENT
Start: 2022-09-06 | End: 2022-09-07 | Stop reason: HOSPADM

## 2022-09-06 RX ADMIN — DOCUSATE SODIUM AND SENNOSIDES 1 TABLET: 8.6; 5 TABLET, FILM COATED ORAL at 08:09

## 2022-09-06 RX ADMIN — TICAGRELOR 90 MG: 90 TABLET ORAL at 08:09

## 2022-09-06 RX ADMIN — LISINOPRIL 10 MG: 10 TABLET ORAL at 08:09

## 2022-09-06 RX ADMIN — ATORVASTATIN CALCIUM 80 MG: 40 TABLET, FILM COATED ORAL at 08:09

## 2022-09-06 RX ADMIN — HEPARIN SODIUM 12 UNITS/KG/HR: 10000 INJECTION, SOLUTION INTRAVENOUS at 01:09

## 2022-09-06 RX ADMIN — TICAGRELOR 180 MG: 90 TABLET ORAL at 08:09

## 2022-09-06 RX ADMIN — ASPIRIN 325 MG ORAL TABLET 325 MG: 325 PILL ORAL at 12:09

## 2022-09-06 RX ADMIN — ACETAMINOPHEN 650 MG: 325 TABLET, FILM COATED ORAL at 11:09

## 2022-09-06 RX ADMIN — ACETAMINOPHEN 650 MG: 325 TABLET, FILM COATED ORAL at 10:09

## 2022-09-06 RX ADMIN — SODIUM CHLORIDE: 0.9 INJECTION, SOLUTION INTRAVENOUS at 08:09

## 2022-09-06 RX ADMIN — CLOPIDOGREL 600 MG: 75 TABLET, FILM COATED ORAL at 03:09

## 2022-09-06 RX ADMIN — TIROFIBAN 0.07 MCG/KG/MIN: 5 INJECTION, SOLUTION INTRAVENOUS at 08:09

## 2022-09-06 RX ADMIN — Medication 9 MG: at 11:09

## 2022-09-06 RX ADMIN — HEPARIN SODIUM 12 UNITS/KG/HR: 10000 INJECTION, SOLUTION INTRAVENOUS at 03:09

## 2022-09-06 NOTE — PROGRESS NOTES
Pt arrived to floor safely, ambulatory, heart monitor applied, oriented to room and unit. EKG print ou from EMS sown short run of V tach and pt in bigeminy. EKG shown acute MI, STEMI. Dr Beverly notified.

## 2022-09-06 NOTE — NURSING
12cc of air removed from vasband completely removed, site clean, dry, and  intact. Will continue to monitor.

## 2022-09-06 NOTE — NURSING
0615- Pt arrived to unit from cath lab, No distress noted. VSS, Pulses palpable, neurovascular check WNL. TR band in place R radial.

## 2022-09-06 NOTE — ED TRIAGE NOTES
"Reports to ED c c/o mid-epigastric abd pain that began on Sunday. +Burning, aching pain. Also reports sebas intermittent arm numbness. States "my arms been hurting for a while since lyudmila been working. For years." +HA. +Decreased fluid intake/appetite. +Decreased urinary output. Reports was constipated; however, took laxative and had a normal bowel movement today. +N/V. Denies SOB  "

## 2022-09-06 NOTE — RESPIRATORY THERAPY
EKG obtained. EKG showed Acute MI. RN notified. MD called to bedside. Pt on NRB mask. Pt in no apparent distress.

## 2022-09-06 NOTE — ED PROVIDER NOTES
Chief Complaint: not feeling well     History of Present Illness:    Adarsh Arriola 67 y.o. with a  has a past medical history of Hypertension. who presents to the emergency department today with a complaint of not feeling well. Pt reports that this started Sunday morning. He didn't fell quite himself. Felt some abdominal discomfort and headache. Had to leave [employer] and go home. When he got home he then started to have nausea and vomiting. He had a good bowel movement yesterday. None today. Tried to take Milk of Magnesium to have a bowel movement and his vomiting returned. He denies diarrhea, hematemesis, bilious vomiting, coffee ground emesis. No melena. He has occasional bilateral arm pain as well. Denies injury or trauma. Occasionally feels like something is stuck in his chest but feels better after he vomits or has a bowel movement. This has been going on for some time now. No shortness of breath, no chest pain just feels like something gets stuck. No cough, no fevers, no chills. No leg swelling. No back pain.     ROS    Constitutional: No fever, no chills.  Eyes: No discharge. No pain.  HENT: No nasal drainage. No ear ache. No sore throat.  Cardiovascular: No palpitations.  Respiratory: No cough, no shortness of breath.  Gastrointestinal: See above.   Genitourinary: No hematuria, dysuria, urgency.  Musculoskeletal: No back pain.   Skin: No rashes, no lesions.  Neurological: No focal weakness.    Otherwise remaining ROS negative     The history is provided by the patient      ALLERGIES REVIEWED  MEDICATIONS REVIEWED  PMH/PSH/SOC/FH REVIEWED :  Adarsh Arriola  has a past medical history of Hypertension. and   has no past surgical history on file. with  reports that he has never smoked. He has never used smokeless tobacco. He reports that he does not drink alcohol and does not use drugs. and a family history is not on file.      Nursing/Ancillary staff note reviewed.  VS reviewed         Physical Exam     BP  "117/71   Pulse 61   Temp 97.5 °F (36.4 °C) (Oral)   Resp (!) 22   Ht 5' 10" (1.778 m)   Wt 72.6 kg (160 lb)   SpO2 99%   BMI 22.96 kg/m²     Physical Exam    General Appearance: The patient is alert, has no immediate need for airway protection and no signs of toxicity. No acute distress. Lying in bed but able to sit up without difficulty.   HEENT: Eyes: Pupils equal and round no pallor or injection. Extra ocular movements intact. No drainage.       Mouth: Mucous membranes are moist. Oropharynx clear.   Neck:Neck is supple non-tender. No lymphadenopathy. No stridor.   Respiratory: There are no retractions, lungs are clear to auscultation. No wheezing, no crackles. Chest wall nontender to palpation.   Cardiovascular: Regular rate and rhythm. No murmurs, rubs or gallops.  Gastrointestinal:  Abdomen is soft and non-tender, no masses, bowel sounds normal. No guarding, no rebound.  No pulsatile mass.   Neurological: Alert and oriented x 4. CN II-XII grossly intact. No focal weakness. Strength intact 5/5 bilaterally in upper and lower extremities.   Skin: Warm and dry, no rashes.  Musculoskeletal: Extremities are non-tender, non-swollen and have full range of motion. Back NTTP along the midline.     DIFFERENTIAL DIAGNOSIS: After history and physical exam a differential diagnosis was considered, but was not limited to,  gastritis, gastroenteritis, pancreatitis, cholecystitis, ileus, small bowel obstruction, appendicitis, myocardial ischemia, pericarditis, pulmonary embolus, chest wall pain, pleural inflammation, pulmonary infectious causes, aortic dissection.   .          I independently interpreted the lab results and it showed the following:  troponin 20.5, CMP with elevated LFTs, creatinine 1.6, CBC unremarkable.       I independently interpreted the Xrays:   Abd: no obstructive pattern  Chest: no consolidation, no pneumothorax, no widened mediastinum.         I independently ordered and interpreted the EKG and it " showed:  60 bpm, normal axis, no STEMI, nonspecific T wave abnormality , q waves in the inferior leads read by myself read by cardiology pending.           ED Course     Medications   heparin 25,000 units in dextrose 5% (100 units/ml) IV bolus from bag INITIAL BOLUS (max bolus 4000 units) (has no administration in time range)   heparin 25,000 units in dextrose 5% 250 mL (100 units/mL) infusion LOW INTENSITY nomogram - [facility] (has no administration in time range)   heparin 25,000 units in dextrose 5% (100 units/ml) IV bolus from bag - ADDITIONAL PRN BOLUS - 30 units/kg (max bolus 4000 units) (has no administration in time range)   heparin 25,000 units in dextrose 5% (100 units/ml) IV bolus from bag - ADDITIONAL PRN BOLUS - 60 units/kg (max bolus 4000 units) (has no administration in time range)   sodium chloride 0.9% bolus 1,000 mL (1,000 mLs Intravenous New Bag 9/5/22 2341)   ondansetron injection 4 mg (4 mg Intravenous Given 9/5/22 2341)   ketorolac injection 15 mg (15 mg Intravenous Given 9/5/22 2341)   aspirin tablet 325 mg (325 mg Oral Given 9/6/22 0048)         ED Course as of 09/06/22 0113   Tue Sep 06, 2022   0053 I spoke with [facility] who accepts pt for admission.  [JA]   0110 Pt with Q waves and elevated troponin, likely NSTEMI, will start heparin  [JA]      ED Course User Index  [JA] Marlee Burciaga MD              Medical Decision Making:   History:   I obtained history from:  The patient  Old Medical Records: I decided to obtain old medical records. Pt was seen 1/18/21 for muscle strain.   Reviewed and summarized the old medical record and it showed: pts PCP is Gamaliel Friedman MD.              Independently Interpreted Test(s):   I have ordered and independently interpreted X-rays - see prior notes.  I have ordered and independently interpreted EKG Reading(s) - see prior notes  Clinical Tests:   I have ordered and independently interpreted Lab Tests: Ordered and Reviewed  Radiological Study: Ordered and  Reviewed  Medical Tests: Ordered and Reviewed        ED Management:  Adarsh Arriola's workup today shows and elevated troponin and Q waves on his EKG, he most likely had a heart attack, NSTEMI. No contraindications to heparin elicited.  Will place on heparin. His LFTs are elevated, he reports no drinking history. He is chest pain free at this time. He will be transferred to [facility] for continued care and management.       Other:   I have discussed this case with another health care provider.  I discussed the case with: Dr Aguiar with [facility], accepts in behalf of Dr Soto.       Voice recognition software utilized in this note.              Impression      The primary encounter diagnosis was NSTEMI (non-ST elevated myocardial infarction). Diagnoses of Vomiting and Elevated troponin were also pertinent to this visit.            Critical Care   Date/Time:   Performed by: Marlee Burciaga MD  Authorized by: Marlee Burciaga MD   Total critical care time (exclusive of procedural time) :  49 minutes  Critical care time was exclusive of separately billable procedures and treating other patients.  Critical care was necessary to treat or prevent imminent or life-threatening deterioration of the following conditions: NSTEMI  Critical care was time spent personally by me on the following activities: development of treatment plan with patient or surrogate, interpretation of cardiac output measurements, examination of patient, evaluation of patient's response to treatment, obtaining history from patient or surrogate, ordering and performing treatments and interventions, ordering and review of radiographic studies, ordering and review of laboratory studies, pulse oximetry, review of old charts and re-evaluation of patient's condition.           Marlee Burciaga MD  09/06/22 0114

## 2022-09-06 NOTE — H&P
"History & Physical  [facility]    Attending: Prabhakar Soto MD  Admit Date: 9/5/2022  Today's Date: 09/06/2022        SUBJECTIVE:   HPI  Patient with Pmhx of HTN reports multiple week history of transient feeling of chest pressure with nausea and vomiting. Patient was awoken from sleep at 10 Pm 9/5/22 by nausea, sweating and chest pressure. Patient admitted from [facility] initially for NSTEMI. While on the floor a repeat EKG patient found to be having a STEMI. Cardiology Consulted and patient was taken to cath lab.    Review of Systems   Constitutional:  Positive for diaphoresis.   HENT:  Negative for congestion.    Respiratory:  Negative for cough and shortness of breath.    Cardiovascular:  Negative for chest pain, palpitations and leg swelling.   Gastrointestinal:  Positive for abdominal pain, heartburn, nausea and vomiting.   Musculoskeletal:  Negative for neck pain.   Skin: Negative.    Neurological:  Positive for dizziness and headaches. Negative for tingling.   Psychiatric/Behavioral: Negative.       OBJECTIVE:     Vital Signs Trends/Hx Reviewed  Vitals:    09/06/22 0138 09/06/22 0207 09/06/22 0303 09/06/22 0309   BP:  113/68 123/67    BP Location:  Left arm Left arm    Patient Position:  Sitting Lying    Pulse: 63 67 (!) 45    Resp: 20 (!) 22 16    Temp:   97 °F (36.1 °C)    TempSrc:   Oral    SpO2: 98% 100% 99%    Weight:       Height:    5' 10" (1.778 m)            Physical Exam  HENT:      Head: Normocephalic.      Right Ear: External ear normal.      Left Ear: External ear normal.      Nose: Nose normal.      Mouth/Throat:      Mouth: Mucous membranes are moist.   Eyes:      Extraocular Movements: Extraocular movements intact.      Pupils: Pupils are equal, round, and reactive to light.   Cardiovascular:      Rate and Rhythm: Bradycardia present.      Pulses: Normal pulses.   Abdominal:      Palpations: Abdomen is soft.   Musculoskeletal:         General: Normal range of motion.      " Cervical back: Normal range of motion.   Skin:     General: Skin is warm.      Capillary Refill: Capillary refill takes less than 2 seconds.   Neurological:      General: No focal deficit present.      Mental Status: He is alert and oriented to person, place, and time.   Psychiatric:         Mood and Affect: Mood normal.       Laboratory:  Recent Labs   Lab 09/06/22  0412   WBC 10.45   RBC 4.98   HGB 13.5*   HCT 42.4      MCV 85   MCH 27.1   MCHC 31.8*     Recent Labs   Lab 09/05/22  2343   *   K 3.7   CL 93*   CO2 32*   BUN 24*   CREATININE 1.64*     No results for input(s): PH, PCO2, PO2, HCO3, POCSATURATED, BE in the last 24 hours.      Chest Imaging:   No significant findings on chest x-ray     ASSESSMENT & RECOMMENDATIONS     Neuro/Psych  No known issues at this time.     CV  #STEMI  Patient with elevated troponin 20.5-->25  Elevated ST segment on EKG  No reported chest pain  Plavix loaded 600 mg  Aspirin 325 mg given in [facility]  Heparin infusion started  Cardiology consulted   Urgent Left Heart Catheterization  Continue to monitor for any EKG changes or chest pain  Continue Plavix 75mg daily  Continue Aspirin 81mg daily     Pulm  O2 via rebreather for symptomatic relief  O2 saturation 100%      FEN/GI  # Elevated transaminases   AST//55  Diet: Cardiac  Electrolytes are wnl     RENAL    BUN/Cr: 23/1.5, baseline 11/1.03  Continue to monitor renal status and urine output     Heme  H/H 14.0/43.3; stable  WBC 8.56  DVT prophylaxis: Heparin (gtt)    Endo  No known issues at this time.    ID  No known issues at this time.      Win Arroyo M.D.  [facility]  09/06/2022 4:42 AM

## 2022-09-06 NOTE — PROGRESS NOTES
Progress Note  [facility]    Attending: Rosi  Admit Date: 9/5/2022  Today's Date: 09/06/2022    Patient with Pmhx of HTN reports multiple week history of transient feeling of chest pressure with nausea and vomiting. Patient was awoken from sleep at 10 Pm 9/5/22 by nausea, sweating and chest pressure. Patient admitted from [facility] initially for NSTEMI and loaded with ASA and plavix as well as started a heparin drip. While on the floor a repeat EKG patient found to be having a STEMI. Cardiology Consulted and patient was taken to cath lab.    SUBJECTIVE:     Pt went to cath had found to have occluded R coronary artery which had KILO placed in it. Cardiology then recommended Aggrastat for the next 18hrs (starting at 530) as well as changing plavix for brillinta, repeating an ECHO, increasing statin, and adding BB and ACE/Arb. After pt returned from cath he denied any complaints and was neurovascularly intact.    Review of Systems   Constitutional:  Negative for chills, fever and malaise/fatigue.   HENT:  Negative for congestion, sinus pain and sore throat.    Eyes:  Negative for photophobia.   Respiratory:  Negative for cough, sputum production and shortness of breath.    Cardiovascular:  Negative for chest pain, palpitations and leg swelling.   Gastrointestinal:  Negative for abdominal pain, constipation, diarrhea, nausea and vomiting.   Genitourinary:  Negative for dysuria and hematuria.   Musculoskeletal:  Negative for myalgias.   Skin:  Negative for rash.   Neurological:  Negative for sensory change, weakness and headaches.     OBJECTIVE:     Vital Signs Trends/Hx Reviewed  Vitals:    09/06/22 0630 09/06/22 0645 09/06/22 0700 09/06/22 0715   BP: 95/64  (!) 102/58 (!) 99/57   BP Location:       Patient Position:       Pulse: (!) 56 (!) 53 83 (!) 55   Resp: (!) 26 (!) 22 20 19   Temp:       TempSrc:       SpO2: (!) 87% (!) 88% (!) 93% 100%   Weight:       Height:                 Physical Exam  HENT:      Head: Normocephalic.      Right Ear: External ear normal.      Left Ear: External ear normal.      Nose: Nose normal.      Mouth/Throat:      Mouth: Mucous membranes are moist.   Eyes:      Extraocular Movements: Extraocular movements intact.      Pupils: Pupils are equal, round, and reactive to light.   Cardiovascular:      Rate and Rhythm: Bradycardia present.      Pulses: Normal pulses.   Abdominal:      Palpations: Abdomen is soft.   Musculoskeletal:         General: Normal range of motion.      Cervical back: Normal range of motion.   Skin:     General: Skin is warm.      Capillary Refill: Capillary refill takes less than 2 seconds.   Neurological:      General: No focal deficit present.      Mental Status: He is alert and oriented to person, place, and time.   Psychiatric:         Mood and Affect: Mood normal.       Laboratory:  Recent Labs   Lab 09/06/22  0412   WBC 10.45   RBC 4.98   HGB 13.5*   HCT 42.4      MCV 85   MCH 27.1   MCHC 31.8*     Recent Labs   Lab 09/06/22  0411   *   K 4.1   CL 98   CO2 25   BUN 23   CREATININE 1.5*   MG 2.4     No results for input(s): PH, PCO2, PO2, HCO3, POCSATURATED, BE in the last 24 hours.      Chest Imaging:   CXR w/o any acute cardiopulm issues    ASSESSMENT & RECOMMENDATIONS     66 yo M w/ PMHx of HTN who presented to the ED after several days of chest tightness and nausea found to have STEMI and was urgently taken to the cath lab 9/5 who now remains on Aggrastat drip.      Neuro/Psych  No acute issues at this time     CV  #STEMI  -Patient with elevated troponin 20.5-->25  -Elevated ST segment on EKG  -No reported chest pain  -Aspirin 325 mg and Plavix 600 mg loaded  -Heparin infusion started  -Cardiology consulted   -Urgent Left Heart Catheterization w/ Rca occluded and KILO placed  -Continue to monitor for any EKG changes or chest pain  -Change Plavix to Brillinta 90mg qd  -Continue Aspirin 81mg daily  -Will restart home  lisinopril and add metoprolol if tolerated as well as atorvastatin     Pulm  No acute issues at this time      FEN/GI  #Elevated transaminases   AST//55  Maybe related to shock from stemi  Diet: Cardiac  Electrolytes are wnl     RENAL  #AIDEE  -Trending down since admit   -This AM BUN/Cr: 23/1.5, baseline 11/1.03  Continue to monitor renal status and urine output     Heme  H/H 13.5/42; stable  WBC 10.4  DVT prophylaxis: Heparin (gtt)    Endo  No acute issues at this time    ID  No acute issues at this time      Alessandro Campos M.D.  [facility]   09/06/2022 8:22 AM     Pt seen and examined with Pulmonary/Critical Care team. Critical Care time was spent validating the history and physical exam, reviewing the lab and imaging results, and discussing the care of the patient with the bedside nurse. The following additional comments are made:    Slightly dilated hypokinetic RV consistent with RV infarct. Bradycardia better. BP acceptable. Pt seems very sleepy.  This may be residua of his sedation.  Will observe.    Jalil Aranda MD  Phone [phone]

## 2022-09-06 NOTE — PROGRESS NOTES
Blood work done, 600 mg of Plavix given, O2 on, heparin drip continued. Pt safely transported to cath lab. Belongings with the family.

## 2022-09-06 NOTE — PLAN OF CARE
The sw met with the pt and Sima Arriola(wife)[phone],dtr Suze Johnson [phone] and another family member who was at bedside during the assessment. The pt is a transfer from [facility] in [place]. The pt lives in [place] with his wife. The pt is a  at a [employer] in [place]. The pt still drives but his wife will transport him home at d/c. The pt's retired as a  but is semi-retired as a . The pt's independent with his adl's and doesn't use dme. The sw completed the white board in the pt's room and gave him a d/c brochure with her name and contact info on it. The sw encouraged the pt and his family to call if they have any further questions or concerns. The sw will continue to follow the pt throughout his transitions of care and will assist with any d/c needs.        09/06/22 1223   Discharge Assessment   Assessment Type Discharge Planning Assessment   Confirmed/corrected address, phone number and insurance Yes   Confirmed Demographics Correct on Facesheet   Source of Information patient   When was your last doctors appointment?   (about 1 year ago)   Communicated JENNY with patient/caregiver Yes   Reason For Admission STEMI   Lives With spouse   Do you expect to return to your current living situation? Yes   Do you have help at home or someone to help you manage your care at home? Yes   Who are your caregiver(s) and their phone number(s)? Sima Arriola(wife)[phone]   Prior to hospitilization cognitive status: Alert/Oriented   Current cognitive status: Alert/Oriented   Walking or Climbing Stairs Difficulty none   Dressing/Bathing Difficulty none   Home Accessibility wheelchair accessible   Home Layout Able to live on 1st floor   Equipment Currently Used at Home none   Readmission within 30 days? No   Patient currently being followed by outpatient case management? No   Do you currently have service(s) that help you manage your care at home? No   Do you take prescription  medications? No   Do you have prescription coverage? No   Coverage Medicare Part A only   Do you have any problems affording any of your prescribed medications? TBD  (pt isn't on any prescription medications)   Who is going to help you get home at discharge? Sima Arriola(wife)[phone]   How do you get to doctors appointments? car, drives self   Are you on dialysis? No   Do you take coumadin? No   Discharge Plan A Home with family   Discharge Plan B Home Health   DME Needed Upon Discharge  other (see comments)  (TBD)   Discharge Plan discussed with: Patient;Spouse/sig other;Adult children   Name(s) and Number(s) Sima Arriola(wife)[phone]/Suze Graham(dtr)[phone]   Discharge Barriers Identified None   Physical Activity   On average, how many days per week do you engage in moderate to strenuous exercise (like a brisk walk)? 3 days   On average, how many minutes do you engage in exercise at this level? 60 min   Housing Stability   In the last 12 months, how many places have you lived? 1   In the last 12 months, was there a time when you did not have a steady place to sleep or slept in a shelter (including now)? N   Transportation Needs   In the past 12 months, has lack of transportation kept you from medical appointments or from getting medications? no   In the past 12 months, has lack of transportation kept you from meetings, work, or from getting things needed for daily living? No   Social Connections   In a typical week, how many times do you talk on the phone with family, friends, or neighbors? More than 3   How often do you get together with friends or relatives? More than 3   How often do you attend Congregational or Jainism services? More than 4   Do you belong to any clubs or organizations such as Congregational groups, unions, fraternal or athletic groups, or school groups? Yes   How often do you attend meetings of the clubs or organizations you belong to? More than 4   Are you , , ,  , never , or living with a partner?    Relationship/Environment   Name(s) of Who Lives With Patient Sima Arriola(wife)[phone]

## 2022-09-06 NOTE — BRIEF OP NOTE
S/p LHC via R radial        Patent LM, LAD, and LCX   Prox RCa occlusion (culprit)   LVEDP 5 mmHg       Intervention:     IVUS guided PCI of RCA with 3.5 x 30 mm Resolute KILO   Post dilated with 4.0 NC balloon        Plan:       DAPT with aspirin + brilinta   Aggrastat for 18 hours   Echo to assess EF   Cardiac rehab II   Anticipate DC in am   Intense statin therapy   Beta-blocker and arb/arb           Full report to follow

## 2022-09-06 NOTE — PLAN OF CARE
Pt neuro status improved throughout the day, diet advanced, vasband removed w/ no complications, UO adequate, pt and family updated on POC. Safety maintained will cont to monitor.

## 2022-09-06 NOTE — CONSULTS
[facility] - [facility] S[facility]  Cardiology  Consult Note    Patient Name: Adarsh Arriola  MRN: [id number]  Admission Date: 9/5/2022  Hospital Length of Stay: 0 days  Code Status: Full Code   Attending Provider: MARY HALL  Consulting Provider: Alberto Gilmore MD  Primary Care Physician: Gamaliel Friedman MD  Principal Problem:Acute coronary syndrome    Patient information was obtained from .     Consults  Subjective:     Chief Complaint:  nausea and vomiting       HPI:       [age] year male with past medical history of hypertension presents to the emergency room with few days complaint nausea and vomiting associated with diaphoresis.  This time around walk not from asleep decided to present to emergency room.  Troponin on arrival 20.        EKG different than his previous EKG from 2021 with hyperdynamic ST changes in inferior leads.  He was initially admitted with diagnosis of NSTEMI. After arrival to the hospital EKG revealed subtle ST elevation in inferior leads with q waves. He was loaded with aspirin and Plavix.  He was already on IV heparin per ACS protocol.       He was evaluated and consent was obtained for urgent left heart catheterization.      Past Medical History:   Diagnosis Date    Hypertension        History reviewed. No pertinent surgical history.    Review of patient's allergies indicates:  No Known Allergies    No current facility-administered medications on file prior to encounter.     Current Outpatient Medications on File Prior to Encounter   Medication Sig    lisinopriL 10 MG tablet Take 10 mg by mouth once daily.     Family History    None       Tobacco Use    Smoking status: Never    Smokeless tobacco: Never   Substance and Sexual Activity    Alcohol use: Never    Drug use: Never    Sexual activity: Not on file     Review of Systems   Constitutional: Negative for diaphoresis, night sweats, weight gain and weight loss.   HENT:  Negative for congestion.    Eyes:  Negative for blurred vision, discharge and  double vision.   Cardiovascular:  Negative for chest pain, claudication, cyanosis, dyspnea on exertion, irregular heartbeat, leg swelling, near-syncope, orthopnea, palpitations, paroxysmal nocturnal dyspnea and syncope.   Respiratory:  Negative for cough, shortness of breath and wheezing.    Endocrine: Negative for cold intolerance, heat intolerance and polyphagia.   Hematologic/Lymphatic: Negative for adenopathy and bleeding problem. Does not bruise/bleed easily.   Skin:  Negative for dry skin and nail changes.   Musculoskeletal:  Negative for arthritis, back pain, falls, joint pain, myalgias and neck pain.   Gastrointestinal:  Positive for nausea and vomiting. Negative for bloating, abdominal pain, change in bowel habit and constipation.   Genitourinary:  Negative for bladder incontinence, dysuria, flank pain, genital sores and missed menses.   Neurological:  Negative for aphonia, brief paralysis, difficulty with concentration, dizziness and weakness.   Psychiatric/Behavioral:  Negative for altered mental status and memory loss. The patient does not have insomnia.    Allergic/Immunologic: Negative for environmental allergies.   Objective:     Vital Signs (Most Recent):  Temp: 97 °F (36.1 °C) (09/06/22 0303)  Pulse: (!) 45 (09/06/22 0303)  Resp: 16 (09/06/22 0303)  BP: 123/67 (09/06/22 0303)  SpO2: 99 % (09/06/22 0303)   Vital Signs (24h Range):  Temp:  [97 °F (36.1 °C)-97.5 °F (36.4 °C)] 97 °F (36.1 °C)  Pulse:  [45-67] 45  Resp:  [16-22] 16  SpO2:  [96 %-100 %] 99 %  BP: ()/(62-71) 123/67     Weight: 72.6 kg (160 lb)  Body mass index is 22.96 kg/m².    SpO2: 99 %  O2 Device (Oxygen Therapy): Non Rebreather Mask      Intake/Output Summary (Last 24 hours) at 9/6/2022 0433  Last data filed at 9/6/2022 0041  Gross per 24 hour   Intake 999 ml   Output --   Net 999 ml       Lines/Drains/Airways       Peripheral Intravenous Line  Duration                  Peripheral IV - Single Lumen 09/05/22 2335 20 G  Anterior;Distal;Right Upper Arm <1 day         Peripheral IV - Single Lumen 09/06/22 0135 20 G Left Antecubital <1 day                    Physical Exam  Constitutional:       Appearance: He is well-developed.      Interventions: He is not intubated.  HENT:      Head: Normocephalic and atraumatic.      Right Ear: External ear normal.      Left Ear: External ear normal.   Eyes:      General: No scleral icterus.        Right eye: No discharge.         Left eye: No discharge.      Conjunctiva/sclera: Conjunctivae normal.      Pupils: Pupils are equal, round, and reactive to light.   Neck:      Thyroid: No thyromegaly.      Vascular: Normal carotid pulses. No carotid bruit, hepatojugular reflux or JVD.      Trachea: No tracheal deviation.   Cardiovascular:      Rate and Rhythm: Normal rate and regular rhythm. No extrasystoles are present.     Chest Wall: PMI is not displaced.      Pulses:           Carotid pulses are 2+ on the right side and 2+ on the left side.       Radial pulses are 2+ on the right side and 2+ on the left side.        Femoral pulses are 2+ on the right side and 2+ on the left side.       Popliteal pulses are 2+ on the right side and 2+ on the left side.        Dorsalis pedis pulses are 2+ on the right side and 2+ on the left side.        Posterior tibial pulses are 2+ on the right side and 2+ on the left side.      Heart sounds: S1 normal and S2 normal. Heart sounds not distant. No midsystolic click. No murmur heard.    No friction rub. No gallop. No S3 sounds.   Pulmonary:      Effort: Pulmonary effort is normal. No tachypnea, bradypnea, accessory muscle usage or respiratory distress. He is not intubated.      Breath sounds: Normal breath sounds. No stridor. No decreased breath sounds, wheezing or rales.   Chest:      Chest wall: No tenderness.   Abdominal:      General: There is no distension or abdominal bruit.      Palpations: There is no mass or pulsatile mass.      Tenderness: There is no  abdominal tenderness. There is no guarding or rebound.   Musculoskeletal:         General: No tenderness. Normal range of motion.      Cervical back: Normal range of motion and neck supple.   Lymphadenopathy:      Cervical: No cervical adenopathy.   Skin:     General: Skin is warm.      Coloration: Skin is not pale.      Findings: No erythema or rash.   Neurological:      Mental Status: He is alert and oriented to person, place, and time.      Cranial Nerves: No cranial nerve deficit.      Coordination: Coordination normal.      Deep Tendon Reflexes: Reflexes are normal and symmetric.   Psychiatric:         Behavior: Behavior normal.         Thought Content: Thought content normal.         Judgment: Judgment normal.       Significant Labs:       LABS  CBC  Recent Labs   Lab 09/05/22  2343 09/06/22  0412   WBC 8.56 10.45   RBC 5.12 4.98   HGB 14.0 13.5*   HCT 43.3 42.4    246   MCV 85 85   MCH 27.3 27.1   MCHC 32.3 31.8*     BMP  Recent Labs   Lab 09/05/22  2343   *   K 3.7   CO2 32*   CL 93*   BUN 24*   CREATININE 1.64*   *       POCT-Glucose  No results found for: POCTGLUCOSE    Recent Labs   Lab 09/05/22  2343   CALCIUM 9.1     LFT  Recent Labs   Lab 09/05/22  2343   PROT 7.0   ALBUMIN 4.2   BILITOT 0.9   *   ALKPHOS 57   ALT 59*     GFR     COAGS  Recent Labs   Lab 09/05/22  2343   INR 1.1   APTT 26.9     CE  Recent Labs   Lab 09/05/22  2343   TROPONINI 20.500*     ABGs  No results for input(s): PH, PCO2, PO2, HCO3, POCSATURATED, BE in the last 24 hours.  BNP  No results for input(s): BNP in the last 168 hours.    LAST HbA1c  No results found for: HGBA1C    Lipid panel  No results found for: CHOL  No results found for: HDL  No results found for: LDLCALC  No results found for: TRIG  No results found for: CHOLHDL       Significant Imaging:     Imaging Results              X-Ray Abdomen Flat And Erect (In process)                      X-Ray Chest AP Portable (In process)                      Assessment and Plan:     Active Diagnoses:    Diagnosis Date Noted POA    PRINCIPAL PROBLEM:  Acute coronary syndrome [I24.9] 09/06/2022 Yes    NSTEMI (non-ST elevated myocardial infarction) [I21.4] 09/06/2022 Yes    Vomiting [R11.10] 09/06/2022 Yes    Elevated troponin [R77.8] 09/06/2022 Yes    Primary hypertension [I10] 09/06/2022 Yes    Chronic renal failure, stage 3 (moderate) [N18.30] 09/06/2022 Yes    Mixed hyperlipidemia [E78.2] 09/06/2022 Yes      Problems Resolved During this Admission:       VTE Risk Mitigation (From admission, onward)           Ordered     IP VTE LOW RISK PATIENT  Once         09/06/22 0310     Place sequential compression device  Until discontinued         09/06/22 0310     heparin 25,000 units in dextrose 5% (100 units/ml) IV bolus from bag - ADDITIONAL PRN BOLUS - 60 units/kg (max bolus 4000 units)  As needed (PRN)        Question:  Heparin Infusion Adjustment (DO NOT MODIFY ANSWER)  Answer:  \[email or web address]\epic\Images\Pharmacy\HeparinInfusions\heparin LOW INTENSITY nomogram for [facility] [id number].pdf    09/06/22 0109     heparin 25,000 units in dextrose 5% (100 units/ml) IV bolus from bag - ADDITIONAL PRN BOLUS - 30 units/kg (max bolus 4000 units)  As needed (PRN)        Question:  Heparin Infusion Adjustment (DO NOT MODIFY ANSWER)  Answer:  \\[email or web address]\epic\Images\Pharmacy\HeparinInfusions\heparin LOW INTENSITY nomogram for [facility] [id number].pdf    09/06/22 0109     heparin 25,000 units in dextrose 5% 250 mL (100 units/mL) infusion LOW INTENSITY nomogram - [facility]  Continuous        Question Answer Comment   Heparin Infusion Adjustment (DO NOT MODIFY ANSWER) \\[email or web address]\epic\Images\Pharmacy\HeparinInfusions\heparin LOW INTENSITY nomogram for [facility] [id number].pdf    Begin at (in units/kg/hr) 12        09/06/22 0109                      Plan:  Urgent left heart catheterization.  Right radial access.  He is a candidate for drug-eluting stent if clinically indicated.  Patient loaded with aspirin and  Plavix.  Currently on IV heparin drip.  Echo to assess ejection fraction.  Lipid profile and initiate statin therapy further recommendation to follow after catheterization.    Risks, benefits and alternatives of cardiac catheterization and possible intervention were discussed with the patient. All questions were answered and informed consent obtained.     I discussed the importance of compliance with dual antiplatelet therapy with the patient to prevent acute or late stent thrombosis with premature discontinuation of the therapy.             > 40 minutes was spent in the care of this patient for evaluation, critical thinking and decision making to provide critical care. Also discussion with the care team regarding the present condition. Not all time was spent in direct face to face with patient as time was spent reviewing ECGs, CXR, labs, medications and past studies.         Thank you for your consult.           Alberto Gilmore MD  Cardiology   [facility] Surg

## 2022-09-06 NOTE — CONSULTS
[facility] - Intensive Care  Cardiology  Consult Note    Patient Name: Adarsh Arriola  MRN: [id number]  Admission Date: 9/5/2022  Hospital Length of Stay: 0 days  Code Status: Full Code   Attending Provider: Prabhakar Soto III, MD   Consulting Provider: Jose Calixto NP  Primary Care Physician: Gamaliel Friedman MD  Principal Problem:STEMI (ST elevation myocardial infarction)    Patient information was obtained from patient and ER records.     [facility]  Consult performed by: Jose Calixto NP  Consult ordered by: Win Arroyo MD        Subjective:     Chief Complaint:  See Consult by Dr Gilmore     HPI:   No notes on file    No new subjective & objective note has been filed under this hospital service since the last note was generated.    Assessment and Plan:     No notes have been filed under this hospital service.  Service: Cardiology      VTE Risk Mitigation (From admission, onward)         Ordered     heparin infusion 1,000 units/500 ml in 0.9% NaCl (pressure line flush)  Intra-op continuous PRN         09/06/22 0454     IP VTE LOW RISK PATIENT  Once         09/06/22 0310     Place sequential compression device  Until discontinued         09/06/22 0310     heparin 25,000 units in dextrose 5% (100 units/ml) IV bolus from bag - ADDITIONAL PRN BOLUS - 60 units/kg (max bolus 4000 units)  As needed (PRN)        Question:  Heparin Infusion Adjustment (DO NOT MODIFY ANSWER)  Answer:  \\[email or web address]\epic\Images\Pharmacy\HeparinInfusions\heparin LOW INTENSITY nomogram for [facility] [id number].pdf    09/06/22 0109     heparin 25,000 units in dextrose 5% (100 units/ml) IV bolus from bag - ADDITIONAL PRN BOLUS - 30 units/kg (max bolus 4000 units)  As needed (PRN)        Question:  Heparin Infusion Adjustment (DO NOT MODIFY ANSWER)  Answer:  \\[email or web address]\epic\Images\Pharmacy\HeparinInfusions\heparin LOW INTENSITY nomogram for [facility] [id number].pdf    09/06/22 0109     heparin 25,000 units in dextrose 5% 250  mL (100 units/mL) infusion LOW INTENSITY nomogram - [facility]  Continuous        Question Answer Comment   Heparin Infusion Adjustment (DO NOT MODIFY ANSWER) \\[email or web address]\epic\Images\Pharmacy\HeparinInfusions\heparin LOW INTENSITY nomogram for [facility] [id number].pdf    Begin at (in units/kg/hr) 12        09/06/22 0109                Thank you for your consult. I will follow-up with patient. Please contact us if you have any additional questions.    Jose Calixto NP  Cardiology   [place] - Intensive Care

## 2022-09-06 NOTE — HPI
Patient is a 67 yr male with Pmhx  of HTN reporting for nausea and vomiting with elevated troponins. Patient reports waking from sleep at 10 pm feeling bad with nausea and vomiting. Patient states he had a similar episode last week that resolved on its own. Patient initially arrived at [facility] ED for evaluation. On evaluation at ED patient was not reporting any chest pain and the nausea had mostly resolved. Troponin was significantly elevated at 20.5. No reported CP. S/p ASA and Plavix load and heparin drip, cards consulted. Patient sent to [facility] for ACS evalutation and treatment for NSTEMI.

## 2022-09-06 NOTE — PROGRESS NOTES
09/06/22 0312   Admission   Initial VN Admission Questions Complete   Communication Issues? None   Shift   Virtual Nurse - Patient Verbalized Approval Of Camera Use   Safety/Activity   Patient Rounds visualized patient   Safety Promotion/Fall Prevention instructed to call staff for mobility;side rails raised x 2;Fall Risk reviewed with patient/family   VIRTUAL NURSE: Pt arrived to unit from [facility]. Spouse at bedside. Admission questions completed with patient at this time. Care plan and safety precautions reviewed. Pt verbalized no complaints, no needs expressed. Instructed to use call light for assistance, they verbalized understanding. Bedside nurse to notify team of pt's arrival. Awaiting orders

## 2022-09-06 NOTE — SUBJECTIVE & OBJECTIVE
Interval History: On the floor trop 20.5>25 w/ EKG revealing STEMI. Cards consulted, S/p Urgent Cath revealed complete block of RCA. S/p drug eluting stent. Tolerated procedure well. Good post op course. VSS, afebrile, hemodynamically stable. No further chest pain. Follow-up EKG stable.    Review of Systems  Review of Systems   Constitutional:  Positive for diaphoresis.   HENT:  Negative for congestion.    Respiratory:  Negative for cough and shortness of breath.    Cardiovascular:  Negative for chest pain, palpitations and leg swelling.   Gastrointestinal:  Positive for abdominal pain, heartburn, nausea and vomiting.   Musculoskeletal:  Negative for neck pain.   Skin: Negative.    Neurological:  Positive for dizziness and headaches. Negative for tingling.   Psychiatric/Behavioral: Negative.    Objective:     Vital Signs (Most Recent):  Temp: 97 °F (36.1 °C) (09/06/22 0303)  Pulse: (!) 55 (09/06/22 0715)  Resp: 19 (09/06/22 0715)  BP: (!) 99/57 (09/06/22 0715)  SpO2: 100 % (09/06/22 0715)   Vital Signs (24h Range):  Temp:  [97 °F (36.1 °C)-97.5 °F (36.4 °C)] 97 °F (36.1 °C)  Pulse:  [45-83] 55  Resp:  [16-26] 19  SpO2:  [87 %-100 %] 100 %  BP: ()/(57-71) 99/57     Weight: 72.6 kg (160 lb)  Body mass index is 22.96 kg/m².    Intake/Output Summary (Last 24 hours) at 9/6/2022 0745  Last data filed at 9/6/2022 0041  Gross per 24 hour   Intake 999 ml   Output --   Net 999 ml      Physical Exam  Constitutional:       Appearance: Normal appearance. He is normal weight.   HENT:      Head: Normocephalic and atraumatic.      Mouth/Throat:      Mouth: Mucous membranes are moist.      Pharynx: Oropharynx is clear.   Eyes:      Extraocular Movements: Extraocular movements intact.      Conjunctiva/sclera: Conjunctivae normal.      Pupils: Pupils are equal, round, and reactive to light.   Cardiovascular:      Rate and Rhythm: Regular rhythm. Bradycardia present.      Pulses: Normal pulses.      Heart sounds: Normal heart  sounds.   Pulmonary:      Effort: Pulmonary effort is normal.      Breath sounds: Normal breath sounds.   Abdominal:      General: Abdomen is flat. Bowel sounds are normal. There is no distension.      Palpations: Abdomen is soft.      Tenderness: There is no abdominal tenderness.   Musculoskeletal:      Cervical back: Normal range of motion.      Right lower leg: No edema.      Left lower leg: No edema.   Skin:     General: Skin is warm and dry.      Capillary Refill: Capillary refill takes less than 2 seconds.   Neurological:      Mental Status: He is alert and oriented to person, place, and time. Mental status is at baseline.   Psychiatric:         Mood and Affect: Mood normal.     Significant Labs: All pertinent labs within the past 24 hours have been reviewed.  CBC:   Recent Labs   Lab 09/05/22 2343 09/06/22 0412   WBC 8.56 10.45   HGB 14.0 13.5*   HCT 43.3 42.4    246     CMP:   Recent Labs   Lab 09/05/22 2343 09/06/22 0411   * 134*   K 3.7 4.1   CL 93* 98   CO2 32* 25   * 118*   BUN 24* 23   CREATININE 1.64* 1.5*   CALCIUM 9.1 8.8   PROT 7.0 6.6   ALBUMIN 4.2 3.4*   BILITOT 0.9 1.0   ALKPHOS 57 50*   * 267*   ALT 59* 55*   ANIONGAP 7* 11     Troponin:   Recent Labs   Lab 09/05/22 2343 09/06/22 0412   TROPONINI 20.500* 25.859*       Significant Imaging: I have reviewed all pertinent imaging results/findings within the past 24 hours.

## 2022-09-07 VITALS
HEIGHT: 70 IN | RESPIRATION RATE: 24 BRPM | SYSTOLIC BLOOD PRESSURE: 101 MMHG | DIASTOLIC BLOOD PRESSURE: 76 MMHG | WEIGHT: 164.25 LBS | OXYGEN SATURATION: 98 % | TEMPERATURE: 99 F | BODY MASS INDEX: 23.51 KG/M2 | HEART RATE: 70 BPM

## 2022-09-07 DIAGNOSIS — I25.10 CORONARY ARTERY DISEASE, UNSPECIFIED VESSEL OR LESION TYPE, UNSPECIFIED WHETHER ANGINA PRESENT, UNSPECIFIED WHETHER NATIVE OR TRANSPLANTED HEART: ICD-10-CM

## 2022-09-07 DIAGNOSIS — I21.11 ST ELEVATION MYOCARDIAL INFARCTION INVOLVING RIGHT CORONARY ARTERY: Primary | ICD-10-CM

## 2022-09-07 PROBLEM — I50.21 ACUTE SYSTOLIC HEART FAILURE: Status: ACTIVE | Noted: 2022-09-07

## 2022-09-07 PROBLEM — I21.9: Status: ACTIVE | Noted: 2022-09-07

## 2022-09-07 LAB
ALBUMIN SERPL BCP-MCNC: 2.8 G/DL (ref 3.5–5.2)
ALP SERPL-CCNC: 48 U/L (ref 55–135)
ALT SERPL W/O P-5'-P-CCNC: 97 U/L (ref 10–44)
ANION GAP SERPL CALC-SCNC: 8 MMOL/L (ref 8–16)
AST SERPL-CCNC: 250 U/L (ref 10–40)
BILIRUB SERPL-MCNC: 1.1 MG/DL (ref 0.1–1)
BUN SERPL-MCNC: 18 MG/DL (ref 8–23)
CALCIUM SERPL-MCNC: 8.3 MG/DL (ref 8.7–10.5)
CHLORIDE SERPL-SCNC: 104 MMOL/L (ref 95–110)
CO2 SERPL-SCNC: 21 MMOL/L (ref 23–29)
CREAT SERPL-MCNC: 1 MG/DL (ref 0.5–1.4)
EST. GFR  (NO RACE VARIABLE): >60 ML/MIN/1.73 M^2
GLUCOSE SERPL-MCNC: 91 MG/DL (ref 70–110)
MAGNESIUM SERPL-MCNC: 2.4 MG/DL (ref 1.6–2.6)
PHOSPHATE SERPL-MCNC: 2.1 MG/DL (ref 2.7–4.5)
POCT GLUCOSE: 78 MG/DL (ref 70–110)
POCT GLUCOSE: 88 MG/DL (ref 70–110)
POCT GLUCOSE: 96 MG/DL (ref 70–110)
POTASSIUM SERPL-SCNC: 4.6 MMOL/L (ref 3.5–5.1)
PROT SERPL-MCNC: 5.9 G/DL (ref 6–8.4)
SODIUM SERPL-SCNC: 133 MMOL/L (ref 136–145)

## 2022-09-07 PROCEDURE — 36415 COLL VENOUS BLD VENIPUNCTURE: CPT | Performed by: INTERNAL MEDICINE

## 2022-09-07 PROCEDURE — 25000003 PHARM REV CODE 250: Performed by: STUDENT IN AN ORGANIZED HEALTH CARE EDUCATION/TRAINING PROGRAM

## 2022-09-07 PROCEDURE — 84100 ASSAY OF PHOSPHORUS: CPT | Performed by: STUDENT IN AN ORGANIZED HEALTH CARE EDUCATION/TRAINING PROGRAM

## 2022-09-07 PROCEDURE — 99233 PR SUBSEQUENT HOSPITAL CARE,LEVL III: ICD-10-PCS | Mod: ,,, | Performed by: NURSE PRACTITIONER

## 2022-09-07 PROCEDURE — 94761 N-INVAS EAR/PLS OXIMETRY MLT: CPT

## 2022-09-07 PROCEDURE — 83735 ASSAY OF MAGNESIUM: CPT | Performed by: STUDENT IN AN ORGANIZED HEALTH CARE EDUCATION/TRAINING PROGRAM

## 2022-09-07 PROCEDURE — 80053 COMPREHEN METABOLIC PANEL: CPT | Performed by: INTERNAL MEDICINE

## 2022-09-07 PROCEDURE — 25000003 PHARM REV CODE 250: Performed by: NURSE PRACTITIONER

## 2022-09-07 PROCEDURE — 99233 SBSQ HOSP IP/OBS HIGH 50: CPT | Mod: ,,, | Performed by: NURSE PRACTITIONER

## 2022-09-07 PROCEDURE — 25000003 PHARM REV CODE 250: Performed by: INTERNAL MEDICINE

## 2022-09-07 PROCEDURE — 63600175 PHARM REV CODE 636 W HCPCS: Performed by: STUDENT IN AN ORGANIZED HEALTH CARE EDUCATION/TRAINING PROGRAM

## 2022-09-07 RX ORDER — ASPIRIN 81 MG/1
81 TABLET ORAL DAILY
Qty: 30 TABLET | Refills: 11 | Status: SHIPPED | OUTPATIENT
Start: 2022-09-08 | End: 2023-09-08

## 2022-09-07 RX ORDER — METOPROLOL SUCCINATE 25 MG/1
25 TABLET, EXTENDED RELEASE ORAL DAILY
Status: DISCONTINUED | OUTPATIENT
Start: 2022-09-07 | End: 2022-09-07 | Stop reason: HOSPADM

## 2022-09-07 RX ORDER — DIPHENHYDRAMINE HCL 25 MG
25 CAPSULE ORAL ONCE
Status: COMPLETED | OUTPATIENT
Start: 2022-09-07 | End: 2022-09-07

## 2022-09-07 RX ORDER — ATORVASTATIN CALCIUM 80 MG/1
80 TABLET, FILM COATED ORAL DAILY
Qty: 30 TABLET | Refills: 11 | Status: SHIPPED | OUTPATIENT
Start: 2022-09-08 | End: 2023-09-08

## 2022-09-07 RX ORDER — METOPROLOL SUCCINATE 25 MG/1
25 TABLET, EXTENDED RELEASE ORAL DAILY
Qty: 30 TABLET | Refills: 11 | Status: SHIPPED | OUTPATIENT
Start: 2022-09-08 | End: 2023-09-08

## 2022-09-07 RX ORDER — LISINOPRIL 10 MG/1
10 TABLET ORAL DAILY
Qty: 30 TABLET | Refills: 11 | Status: SHIPPED | OUTPATIENT
Start: 2022-09-08 | End: 2023-09-08

## 2022-09-07 RX ORDER — SODIUM,POTASSIUM PHOSPHATES 280-250MG
2 POWDER IN PACKET (EA) ORAL ONCE
Status: COMPLETED | OUTPATIENT
Start: 2022-09-07 | End: 2022-09-07

## 2022-09-07 RX ORDER — ASPIRIN 81 MG/1
81 TABLET ORAL DAILY
Status: DISCONTINUED | OUTPATIENT
Start: 2022-09-07 | End: 2022-09-07 | Stop reason: HOSPADM

## 2022-09-07 RX ADMIN — DIPHENHYDRAMINE HYDROCHLORIDE 25 MG: 25 CAPSULE ORAL at 01:09

## 2022-09-07 RX ADMIN — POTASSIUM & SODIUM PHOSPHATES POWDER PACK 280-160-250 MG 2 PACKET: 280-160-250 PACK at 10:09

## 2022-09-07 RX ADMIN — LISINOPRIL 10 MG: 10 TABLET ORAL at 08:09

## 2022-09-07 RX ADMIN — TICAGRELOR 90 MG: 90 TABLET ORAL at 08:09

## 2022-09-07 RX ADMIN — MORPHINE SULFATE 4 MG: 4 INJECTION INTRAVENOUS at 04:09

## 2022-09-07 RX ADMIN — ACETAMINOPHEN 650 MG: 325 TABLET, FILM COATED ORAL at 02:09

## 2022-09-07 RX ADMIN — ATORVASTATIN CALCIUM 80 MG: 40 TABLET, FILM COATED ORAL at 08:09

## 2022-09-07 RX ADMIN — ONDANSETRON 8 MG: 8 TABLET, ORALLY DISINTEGRATING ORAL at 12:09

## 2022-09-07 RX ADMIN — METOPROLOL SUCCINATE 25 MG: 25 TABLET, EXTENDED RELEASE ORAL at 08:09

## 2022-09-07 RX ADMIN — ASPIRIN 81 MG: 81 TABLET, COATED ORAL at 08:09

## 2022-09-07 RX ADMIN — DOCUSATE SODIUM AND SENNOSIDES 1 TABLET: 8.6; 5 TABLET, FILM COATED ORAL at 08:09

## 2022-09-07 NOTE — PROGRESS NOTES
1552- wife at bedside assisted pt with getting dressed. Belongings taken by wife. Additional family members at bedside. Transported to main lobby via wheelchair.

## 2022-09-07 NOTE — PROGRESS NOTES
Progress Note  [facility]    Attending: Rosi  Admit Date: 9/5/2022  Today's Date: 09/07/2022    Patient with Pmhx of HTN reports multiple week history of transient feeling of chest pressure with nausea and vomiting. Patient was awoken from sleep at 10 Pm 9/5/22 by nausea, sweating and chest pressure. Patient admitted from [facility] initially for NSTEMI and loaded with ASA and plavix as well as started a heparin drip. While on the floor a repeat EKG patient found to be having a STEMI. Cardiology Consulted and patient was taken to cath lab.    SUBJECTIVE:     Pt w/o complications after his procedure. Cards rec Asa+brilinta as well as increased statin and BB+Ace-I. A bedside ECHO was performed on rounds and did not show poor RV fxn and the formal done showed EF 45%  w/ decreased LV fxn and decreased RV fxn. ON he became restless and had complaints of a headache and that he was hearing things. He was given 1x benadryl and then slept throughout the night.     Review of Systems   Constitutional:  Negative for chills, fever and malaise/fatigue.   HENT:  Negative for congestion, sinus pain and sore throat.    Eyes:  Negative for photophobia.   Respiratory:  Negative for cough, sputum production and shortness of breath.    Cardiovascular:  Negative for chest pain, palpitations and leg swelling.   Gastrointestinal:  Negative for abdominal pain, constipation, diarrhea, nausea and vomiting.   Genitourinary:  Negative for dysuria and hematuria.   Musculoskeletal:  Negative for myalgias.   Skin:  Negative for rash.   Neurological:  Negative for sensory change, weakness and headaches.     OBJECTIVE:     Vital Signs Trends/Hx Reviewed  Vitals:    09/07/22 0422 09/07/22 0424 09/07/22 0500 09/07/22 0600   BP:   (!) 103/53 (!) 108/54   Pulse:   67 69   Resp: (!) 24  20 (!) 32   Temp:       TempSrc:       SpO2:   99% 99%   Weight:  74.5 kg (164 lb 3.9 oz)     Height:                Physical  Exam  HENT:      Head: Normocephalic.      Right Ear: External ear normal.      Left Ear: External ear normal.      Nose: Nose normal.      Mouth/Throat:      Mouth: Mucous membranes are moist.   Eyes:      Extraocular Movements: Extraocular movements intact.      Pupils: Pupils are equal, round, and reactive to light.   Cardiovascular:      Rate and Rhythm: Bradycardia present.      Pulses: Normal pulses.   Abdominal:      Palpations: Abdomen is soft.   Musculoskeletal:         General: Normal range of motion.      Cervical back: Normal range of motion.   Skin:     General: Skin is warm.      Capillary Refill: Capillary refill takes less than 2 seconds.   Neurological:      General: No focal deficit present.      Mental Status: He is alert and oriented to person, place, and time.   Psychiatric:         Mood and Affect: Mood normal.       Laboratory:  No results for input(s): WBC, RBC, HGB, HCT, PLT, MCV, MCH, MCHC in the last 24 hours.  No results for input(s): GLUCOSE, NA, K, CL, CO2, BUN, CREATININE, MG in the last 24 hours.    Invalid input(s):  CALCIUM  No results for input(s): PH, PCO2, PO2, HCO3, POCSATURATED, BE in the last 24 hours.      Chest Imaging:   No new chest imaging.     ASSESSMENT & RECOMMENDATIONS     68 yo M w/ PMHx of HTN who presented to the ED after several days of chest tightness and nausea found to have STEMI and was urgently taken to the cath lab 9/5 and has been stabilized.     Neuro/Psych  No acute issues at this time     CV  #STEMI  -Patient with elevated troponin 20.5-->25  -Elevated ST segment on EKG  -No reported chest pain  -Aspirin 325 mg and Plavix 600 mg loaded  -Heparin infusion started  -Cardiology consulted   -Urgent Left Heart Catheterization w/ Rca occluded and KILO placed  -Continue to monitor for any EKG changes or chest pain  -Change Plavix to Brillinta 90mg qd  -Continue Aspirin 81mg daily  -Will restart home lisinopril and add metoprolol if tolerated as well as  atorvastatin  -ECHO w/ EF 45% and both LV/RV sys dysfxn and LV diastolic dysfxn     Pulm  No acute issues at this time    FEN/GI  #Elevated transaminases   AST//55 on admit but trending down  Maybe related to shock from stemi  Diet: Cardiac  Electrolytes are wnl     RENAL  #AIDEE  -Trending down since admit   -This AM BUN/Cr: 23/1.5, baseline 11/1.03  Continue to monitor renal status and urine output     Heme  H/H 13.5/42; stable  WBC 10.4  DVT prophylaxis: none    Endo  No acute issues at this time    ID  No acute issues at this time      Alessandro Campos M.D.  [facility]   09/07/2022 7:21 AM    Pt seen and examined with Pulmonary/Critical Care team and this note reviewed and validated with the following additional comments:    Much improved mentation but still not back to baseline. Hemodynamics stable, no chest pain. No ectopy. OK to floor.    Jalil Aranda MD  Phone [phone]

## 2022-09-07 NOTE — PLAN OF CARE
Pt to be discharged home. PIVs to be discontinued. Pressure dsgs to be applied. Dsg to R wrist angio site CDI. D/c instructions to be reviewed. AVS will be given to pt. Wife will transport home.

## 2022-09-07 NOTE — ASSESSMENT & PLAN NOTE
S/p RCA KILO  Troponin 20-25   Continue asa, Brilinta, BB, ACEI, statin  Cardiac rehab as outpatient   Compliance with medication was discussed with particular attention paid to importance of  DAPT for one year without interruption. The risk of abrupt stent occlusion and MI with early discontinuation was specifically stressed. Verbalized understanding and will follow up in the cardiology clinic in 2-3 weeks

## 2022-09-07 NOTE — PLAN OF CARE
Pt is AAOx4. No complaints of nausea, vomiting, or diarrhea. Pt complained of a headache and tylenol given. Restless throughout the night. Argatroban gtt d/c'ed at 3am. Safety precautions maintained. Tolerated all medications well.

## 2022-09-07 NOTE — PROGRESS NOTES
[facility] - Intensive Care  Cardiology  Progress Note    Patient Name: Adarsh Arriola  MRN: [id number]  Admission Date: 9/5/2022  Hospital Length of Stay: 1 days  Code Status: Full Code   Attending Physician: Annmarie Schneider MD   Primary Care Physician: Gamaliel Friedman MD  Expected Discharge Date:   Principal Problem:STEMI (ST elevation myocardial infarction)    Subjective:     Hospital Course:   09/06/2022  [age] male with past medical history of hypertension presents to the emergency room with few days complaint nausea and vomiting associated with diaphoresis.  This time around walk not from asleep decided to present to emergency room.  Troponin on arrival 20.        EKG different than his previous EKG from 2021 with hyperdynamic ST changes in inferior leads.  He was initially admitted with diagnosis of NSTEMI. After arrival to the hospital EKG revealed subtle ST elevation in inferior leads with q waves. He was loaded with aspirin and Plavix.  He was already on IV heparin per ACS protocol.       He was evaluated and consent was obtained for urgent left heart catheterization.    S/p LHC via R radial                     Patent LM, LAD, and LCX              Prox RCa occlusion (culprit)              LVEDP 5 mmHg                    Intervention:                 IVUS guided PCI of RCA with 3.5 x 30 mm Resolute KILO              Post dilated with 4.0 NC balloon           Plan:                    DAPT with aspirin + brilinta              Aggrastat for 18 hours              Echo to assess EF              Cardiac rehab II              Anticipate DC in am              Intense statin therapy              Beta-blocker and arb/arb     09/07/2022 No chest pain overnight. Hemodynamically stable.   TTE   The left ventricle is normal in size with mildly decreased systolic function.   The estimated ejection fraction is 45%.   Grade I left ventricular diastolic dysfunction.   There are segmental left ventricular wall  motion abnormalities.   Normal right ventricular size with mildly reduced right ventricular systolic function.   Mild right atrial enlargement.   Normal central venous pressure (3 mmHg).   The estimated PA systolic pressure is 25 mmHg.     Compliance with medication was discussed with particular attention paid to importance of  DAPT for one year without interruption. The risk of abrupt stent occlusion and MI with early discontinuation was specifically stressed. Verbalized understanding and will follow up in the cardiology clinic in 2-3 weeks            Review of Systems   Constitutional: Negative for diaphoresis.   HENT: Negative.     Eyes: Negative.  Negative for blurred vision.   Cardiovascular:  Negative for chest pain, irregular heartbeat, leg swelling, near-syncope, orthopnea, palpitations, paroxysmal nocturnal dyspnea and syncope.   Respiratory: Negative.  Negative for cough and shortness of breath.    Endocrine: Negative.    Hematologic/Lymphatic: Negative.    Skin: Negative.    Musculoskeletal: Negative.    Gastrointestinal: Negative.  Negative for nausea and vomiting.   Genitourinary: Negative.    Neurological:  Positive for headaches.   Psychiatric/Behavioral: Negative.  Negative for altered mental status.    Allergic/Immunologic: Negative.    Objective:     Vital Signs (Most Recent):  Temp: 98.8 °F (37.1 °C) (09/07/22 0715)  Pulse: 72 (09/07/22 1015)  Resp: (!) 21 (09/07/22 1015)  BP: 105/64 (09/07/22 1000)  SpO2: 100 % (09/07/22 1015)   Vital Signs (24h Range):  Temp:  [98.1 °F (36.7 °C)-98.8 °F (37.1 °C)] 98.8 °F (37.1 °C)  Pulse:  [] 72  Resp:  [11-39] 21  SpO2:  [64 %-100 %] 100 %  BP: ()/() 105/64     Weight: 74.5 kg (164 lb 3.9 oz)  Body mass index is 23.57 kg/m².     SpO2: 100 %  O2 Device (Oxygen Therapy): room air      Intake/Output Summary (Last 24 hours) at 9/7/2022 1034  Last data filed at 9/7/2022 0952  Gross per 24 hour   Intake 2206.27 ml   Output 815 ml   Net 1391.27  ml       Lines/Drains/Airways       Peripheral Intravenous Line  Duration                  Peripheral IV - Single Lumen 09/05/22 2335 20 G Anterior;Distal;Right Upper Arm 1 day         Peripheral IV - Single Lumen 09/06/22 0135 20 G Left Antecubital 1 day                    Physical Exam  Constitutional:       General: He is not in acute distress.     Appearance: He is not diaphoretic.   HENT:      Head: Atraumatic.   Eyes:      General:         Right eye: No discharge.         Left eye: No discharge.   Cardiovascular:      Rate and Rhythm: Normal rate and regular rhythm.   Pulmonary:      Effort: Pulmonary effort is normal.      Breath sounds: Normal breath sounds.   Abdominal:      General: Bowel sounds are normal.      Palpations: Abdomen is soft.   Musculoskeletal:      Right lower leg: No edema.      Left lower leg: No edema.   Skin:     General: Skin is warm and dry.      Capillary Refill: Capillary refill takes less than 2 seconds.   Neurological:      Mental Status: He is alert. Mental status is at baseline.   Psychiatric:         Mood and Affect: Mood normal.         Thought Content: Thought content normal.       Significant Labs: BMP:   Recent Labs   Lab 09/05/22 2343 09/06/22  0411 09/07/22  0557   * 118* 91   * 134* 133*   K 3.7 4.1 4.6   CL 93* 98 104   CO2 32* 25 21*   BUN 24* 23 18   CREATININE 1.64* 1.5* 1.0   CALCIUM 9.1 8.8 8.3*   MG  --  2.4 2.4   , CMP   Recent Labs   Lab 09/05/22 2343 09/06/22  0411 09/07/22  0557   * 134* 133*   K 3.7 4.1 4.6   CL 93* 98 104   CO2 32* 25 21*   * 118* 91   BUN 24* 23 18   CREATININE 1.64* 1.5* 1.0   CALCIUM 9.1 8.8 8.3*   PROT 7.0 6.6 5.9*   ALBUMIN 4.2 3.4* 2.8*   BILITOT 0.9 1.0 1.1*   ALKPHOS 57 50* 48*   * 267* 250*   ALT 59* 55* 97*   ANIONGAP 7* 11 8   , CBC   Recent Labs   Lab 09/05/22 2343 09/06/22  0412   WBC 8.56 10.45   HGB 14.0 13.5*   HCT 43.3 42.4    246   , INR   Recent Labs   Lab 09/05/22  2343   INR 1.1  "  , Lipid Panel   Recent Labs   Lab 09/06/22  0650   CHOL 165   HDL 55   LDLCALC 99.2   TRIG 54   CHOLHDL 33.3   , Troponin   Recent Labs   Lab 09/05/22  2343 09/06/22  0412   TROPONINI 20.500* 25.859*   , and All pertinent lab results from the last 24 hours have been reviewed.    Significant Imaging: Echocardiogram: Transthoracic echo (TTE) complete ([facility] Only):   Results for orders placed or performed during the hospital encounter of 09/05/22   Echo   Result Value Ref Range    AV mean gradient 3 mmHg    Ao peak viktor 1.07 m/s    Ao VTI 18.91 cm    IVRT 91.34 msec    IVS 1.25 (A) 0.6 - 1.1 cm    LA size 2.84 cm    Left Atrium Major Axis 4.90 cm    Left Atrium Minor Axis 5.84 cm    LVIDd 4.41 3.5 - 6.0 cm    LVIDs 3.54 2.1 - 4.0 cm    LVOT diameter 1.98 cm    LVOT peak VTI 19.49 cm    Posterior Wall 0.82 0.6 - 1.1 cm    MV Peak A Viktor 0.88 m/s    E wave deceleration time 254.36 msec    MV Peak E Viktor 0.61 m/s    PV Peak D Viktor 0.26 m/s    PV Peak S Viktor 0.33 m/s    RA Major Axis 5.33 cm    RA Width 3.92 cm    RVDD 2.84 cm    TAPSE 1.44 cm    TR Max Viktor 2.32 m/s    TDI LATERAL 0.11 m/s    TDI SEPTAL 0.06 m/s    LA WIDTH 3.50 cm    Ao root annulus 3.35 cm    AORTIC VALVE CUSP SEPERATION 2.08 cm    PV PEAK VELOCITY 0.79 cm/s    MV stenosis pressure 1/2 time 73.77 ms    LV Diastolic Volume 88.26 mL    LV Systolic Volume 52.14 mL    LVOT peak viktor 1.06 m/s    LA volume (mod) 30.09 cm3    MV "A" wave duration 14.46 msec    MV mean gradient 0 mmHg    MV peak gradient 3 mmHg    MV VTI 29.44 cm    LV LATERAL E/E' RATIO 5.55 m/s    LV SEPTAL E/E' RATIO 10.17 m/s    FS 20 %    LA volume 45.02 cm3    LV mass 155.63 g    Left Ventricle Relative Wall Thickness 0.37 cm    AV valve area 3.17 cm2    AV Velocity Ratio 0.99     AV index (prosthetic) 1.03     MV valve area p 1/2 method 2.98 cm2    MV valve area by continuity eq 2.04 cm2    E/A ratio 0.69     Mean e' 0.09 m/s    Pulm vein S/D ratio 1.27     LVOT area 3.1 cm2    LVOT stroke " volume 59.98 cm3    AV peak gradient 5 mmHg    E/E' ratio 7.18 m/s    LV Systolic Volume Index 27.4 mL/m2    LV Diastolic Volume Index 46.45 mL/m2    LA Volume Index 23.7 mL/m2    LV Mass Index 82 g/m2    Triscuspid Valve Regurgitation Peak Gradient 22 mmHg    LA Volume Index (Mod) 15.8 mL/m2    BSA 1.89 m2    Right Atrial Pressure (from IVC) 3 mmHg    EF 45 %    TV rest pulmonary artery pressure 25 mmHg    Narrative    · The left ventricle is normal in size with mildly decreased systolic   function.  · The estimated ejection fraction is 45%.  · Grade I left ventricular diastolic dysfunction.  · There are segmental left ventricular wall motion abnormalities.  · Normal right ventricular size with mildly reduced right ventricular   systolic function.  · Mild right atrial enlargement.  · Normal central venous pressure (3 mmHg).  · The estimated PA systolic pressure is 25 mmHg.        Assessment and Plan:     Brief HPI: Patient seen this morning on rounds without cardiac complaint. Tolerating GDMT and ambulation.     * STEMI (ST elevation myocardial infarction)  S/p RCA KILO  Troponin 20-25   Continue asa, Brilinta, BB, ACEI, statin  Cardiac rehab as outpatient   Compliance with medication was discussed with particular attention paid to importance of  DAPT for one year without interruption. The risk of abrupt stent occlusion and MI with early discontinuation was specifically stressed. Verbalized understanding and will follow up in the cardiology clinic in 2-3 weeks     Acute systolic heart failure  LVEF 45%, late presentation STEMI  GDMT initiated with BB and ACEI  Repeat TTE p 90D GDMT    Coronary artery disease  Per STEMI    Mixed hyperlipidemia  High intensity statin         VTE Risk Mitigation (From admission, onward)         Ordered     IP VTE LOW RISK PATIENT  Once         09/06/22 0310     Place sequential compression device  Until discontinued         09/06/22 0310                Jose Calixto  NP  Cardiology  [facility] - Intensive Care

## 2022-09-07 NOTE — PROGRESS NOTES
Pt very restless. Pt states he is hearing this things and has a headache. Oriented x 4. Tylenol and melatonin given. Dr Arroyo at bedside to assess pt. Pt remains restless. Per Dr Arroyo ordered to give 1x benadryl 25mg po.

## 2022-09-07 NOTE — HOSPITAL COURSE
09/06/2022  [age] year male with past medical history of hypertension presents to the emergency room with few days complaint nausea and vomiting associated with diaphoresis.  This time around walk not from asleep decided to present to emergency room.  Troponin on arrival 20.        EKG different than his previous EKG from 2021 with hyperdynamic ST changes in inferior leads.  He was initially admitted with diagnosis of NSTEMI. After arrival to the hospital EKG revealed subtle ST elevation in inferior leads with q waves. He was loaded with aspirin and Plavix.  He was already on IV heparin per ACS protocol.       He was evaluated and consent was obtained for urgent left heart catheterization.    S/p LHC via R radial                     Patent LM, LAD, and LCX              Prox RCa occlusion (culprit)              LVEDP 5 mmHg                    Intervention:                 IVUS guided PCI of RCA with 3.5 x 30 mm Resolute KILO              Post dilated with 4.0 NC balloon           Plan:                    DAPT with aspirin + brilinta              Aggrastat for 18 hours              Echo to assess EF              Cardiac rehab II              Anticipate DC in am              Intense statin therapy              Beta-blocker and arb/arb     09/07/2022 No chest pain overnight. Hemodynamically stable.   TTE  The left ventricle is normal in size with mildly decreased systolic function.  The estimated ejection fraction is 45%.  Grade I left ventricular diastolic dysfunction.  There are segmental left ventricular wall motion abnormalities.  Normal right ventricular size with mildly reduced right ventricular systolic function.  Mild right atrial enlargement.  Normal central venous pressure (3 mmHg).  The estimated PA systolic pressure is 25 mmHg.    Compliance with medication was discussed with particular attention paid to importance of  DAPT for one year without interruption. The risk of abrupt stent occlusion and MI with  early discontinuation was specifically stressed. Verbalized understanding and will follow up in the cardiology clinic in 2-3 weeks

## 2022-09-07 NOTE — DISCHARGE SUMMARY
[facility]  Discharge Summary      Patient Name: Adarsh Arriola  MRN: [id number]  Patient Class: IP- Inpatient  Admission Date: 9/5/2022  Hospital Length of Stay: 1 days  Discharge Date and Time:  09/07/2022 4:56 PM  Attending Physician: No att. providers found   Discharging Provider: Mina Aguiar MD  Primary Care Provider: Gamaliel Friedman MD      HPI:   Patient is a 67 yr male with Pmhx  of HTN reporting for nausea and vomiting with elevated troponins. Patient reports waking from sleep at 10 pm feeling bad with nausea and vomiting. Patient states he had a similar episode last week that resolved on its own. Patient initially arrived at [facility] ED for evaluation. On evaluation at ED patient was not reporting any chest pain and the nausea had mostly resolved. Troponin was significantly elevated at 20.5. No reported CP. S/p ASA and Plavix load and heparin drip, cards consulted. Patient sent to [facility] for ACS evalutation and treatment for NSTEMI.      Procedure(s) (LRB):  CATHETERIZATION, HEART, LEFT (Left)  ANGIOGRAM, CORONARY ARTERY (N/A)  PTCA, Single Vessel  IVUS, Coronary  Percutaneous coronary intervention (N/A)  Stent, Drug Eluting, Single Vessel, Coronary      Hospital Course:   Pt initially admitted to the floor, however upon arrival to floor, clinical symptoms prompted repeat trop and EKG. Trop elevated to ~26 and EKG showed STEMI. Pt was emergently taken to cath lab for LHC via R radial. Findings in cath lab were a patent LM, LAD, and LCX. The Prox RCa occlusion (culprit). Cardiology performed an IVUS guided PCI of RCA with 3.5 x 30 mm Resolute KILO and post dilated with 4.0 NC balloon. Pt had an uneventful post op recovery and was deemed stable for discharge on POD#1. Pt was discharged home with ASA, Brilinta, metoprolol, lisinopril, and atorvastatin. Pt was educated on importance of medication compliance, especially with ASA and  Brilinta. Pt was advised to have close  follow-up with cardiology. Return precautions given, pt verbalized understanding. All questions answered.       Physical Exam  Constitutional:       Appearance: Normal appearance. He is normal weight.   HENT:      Head: Normocephalic and atraumatic.      Mouth/Throat:      Mouth: Mucous membranes are moist.      Pharynx: Oropharynx is clear.   Eyes:      Extraocular Movements: Extraocular movements intact.      Conjunctiva/sclera: Conjunctivae normal.      Pupils: Pupils are equal, round, and reactive to light.   Cardiovascular:      Rate and Rhythm: Regular rhythm. Regular rate.      Pulses: Normal pulses.      Heart sounds: Normal heart sounds.   Pulmonary:      Effort: Pulmonary effort is normal.      Breath sounds: Normal breath sounds.   Abdominal:      General: Abdomen is flat. Bowel sounds are normal. There is no distension.      Palpations: Abdomen is soft.      Tenderness: There is no abdominal tenderness.   Musculoskeletal:      Cervical back: Normal range of motion.      Right lower leg: No edema.      Left lower leg: No edema.   Skin:     General: Skin is warm and dry.      Capillary Refill: Capillary refill takes less than 2 seconds.   Neurological:      Mental Status: He is alert and oriented to person, place, and time. Mental status is at baseline.   Psychiatric:         Mood and Affect: Mood normal.          Goals of Care Treatment Preferences:  Code Status: Full Code      Consults:   Consults (From admission, onward)          Status Ordering Provider     Inpatient consult to [facility]iology[facility]  Once        Provider:  Alberto Gilmore MD    Completed DANO HAMMOND     Inpatient consult to Social Work  Once        Provider:  (Not yet assigned)    DAQUAN Motley III            No new Assessment & Plan notes have been filed under this hospital service since the last note was generated.  Service: Hospital Medicine    Final Active Diagnoses:    Diagnosis Date Noted POA    PRINCIPAL PROBLEM:   STEMI (ST elevation myocardial infarction) [I21.3] 09/06/2022 Yes    Acute right ventricular myocardial infarction [I21.9] 09/07/2022 Yes    Acute systolic heart failure [I50.21] 09/07/2022 Yes    Vomiting [R11.10] 09/06/2022 Yes    Elevated troponin [R77.8] 09/06/2022 Yes    Primary hypertension [I10] 09/06/2022 Yes    Chronic renal failure, stage 3 (moderate) [N18.30] 09/06/2022 Yes    Mixed hyperlipidemia [E78.2] 09/06/2022 Yes    Acute coronary syndrome [I24.9] 09/06/2022 Yes    Coronary artery disease [I25.10] 09/06/2022 Yes      Problems Resolved During this Admission:       Discharged Condition: stable    Disposition: Home or Self Care    Follow Up:   Follow-up Information       Gamaliel Friedman MD Follow up in 4 week(s).    Specialty: Family Medicine  Why: hospital discharge follow up  Contact information:  [address]  [address]  [facility] [address]  [phone]                           Patient Instructions:      Ambulatory referral/consult to Cardiology   Standing Status: Future   Referral Priority: Routine Referral Type: Consultation   Referral Reason: Specialty Services Required   Referred to Provider: NAYLA FAN Requested Specialty: Cardiology   Number of Visits Requested: 1     Diet Cardiac     Notify your health care provider if you experience any of the following:  temperature >100.4     Notify your health care provider if you experience any of the following:  persistent nausea and vomiting or diarrhea     Notify your health care provider if you experience any of the following:  severe uncontrolled pain     Notify your health care provider if you experience any of the following:  redness, tenderness, or signs of infection (pain, swelling, redness, odor or green/yellow discharge around incision site)     Notify your health care provider if you experience any of the following:  difficulty breathing or increased cough     Notify your health care provider if you experience any of the following:   severe persistent headache     Notify your health care provider if you experience any of the following:  worsening rash     Notify your health care provider if you experience any of the following:  persistent dizziness, light-headedness, or visual disturbances     Notify your health care provider if you experience any of the following:  increased confusion or weakness     Activity as tolerated       Significant Diagnostic Studies: Labs:   CMP   Recent Labs   Lab 09/05/22  2343 09/06/22  0411 09/07/22  0557   * 134* 133*   K 3.7 4.1 4.6   CL 93* 98 104   CO2 32* 25 21*   * 118* 91   BUN 24* 23 18   CREATININE 1.64* 1.5* 1.0   CALCIUM 9.1 8.8 8.3*   PROT 7.0 6.6 5.9*   ALBUMIN 4.2 3.4* 2.8*   BILITOT 0.9 1.0 1.1*   ALKPHOS 57 50* 48*   * 267* 250*   ALT 59* 55* 97*   ANIONGAP 7* 11 8    and CBC   Recent Labs   Lab 09/05/22 2343 09/06/22 0412   WBC 8.56 10.45   HGB 14.0 13.5*   HCT 43.3 42.4    246       Pending Diagnostic Studies:       None           Medications:  Reconciled Home Medications:      Medication List        START taking these medications      aspirin 81 MG EC tablet  Commonly known as: ECOTRIN  Take 1 tablet (81 mg total) by mouth once daily.  Start taking on: September 8, 2022     atorvastatin 80 MG tablet  Commonly known as: LIPITOR  Take 1 tablet (80 mg total) by mouth once daily.  Start taking on: September 8, 2022     BRILINTA 90 mg tablet  Generic drug: ticagrelor  Take 1 tablet (90 mg total) by mouth 2 (two) times daily.     metoprolol succinate 25 MG 24 hr tablet  Commonly known as: TOPROL-XL  Take 1 tablet (25 mg total) by mouth once daily.  Start taking on: September 8, 2022            CONTINUE taking these medications      lisinopriL 10 MG tablet  Take 1 tablet (10 mg total) by mouth once daily.  Start taking on: September 8, 2022              Indwelling Lines/Drains at time of discharge:   Lines/Drains/Airways       None                   Time spent on the  discharge of patient: >30 minutes    Critical care time spent on the evaluation and treatment of severe organ dysfunction, review of pertinent labs and imaging studies, discussions with consulting providers and discussions with patient/family: >30 minutes.     Mina Aguiar MD, PGY-3  Department of [facility]  [facility] - Intensive Care

## 2022-09-07 NOTE — PLAN OF CARE
09/07/22 1426   Final Note   Assessment Type Final Discharge Note   Anticipated Discharge Disposition Home   What phone number can be called within the next 1-3 days to see how you are doing after discharge? [phone]   Hospital Resources/Appts/Education Provided   (the sw requested a [facility] f/u with the pt for 2-4 weeks. The schedulers will call him with the follow up.)   Post-Acute Status   Discharge Delays None known at this time

## 2022-09-07 NOTE — HOSPITAL COURSE
Pt initially admitted to the floor, however upon arrival to floor, clinical symptoms prompted repeat trop and EKG. Trop elevated to ~26 and EKG showed STEMI. Pt was emergently taken to cath lab for LHC via R radial. Findings in cath lab were a patent LM, LAD, and LCX. The Prox RCa occlusion (culprit). Cardiology performed an IVUS guided PCI of RCA with 3.5 x 30 mm Resolute KILO and post dilated with 4.0 NC balloon. Pt had an uneventful post op recovery and was deemed stable for discharge on POD#1. Pt was discharged home with ASA, Brilinta, metoprolol, lisinopril, and atorvastatin. Pt was educated on importance of medication compliance, especially with ASA and  Brilinta. Pt was advised to have close follow-up with cardiology. Return precautions given, pt verbalized understanding. All questions answered.

## 2022-09-07 NOTE — SUBJECTIVE & OBJECTIVE
Review of Systems   Constitutional: Negative for diaphoresis.   HENT: Negative.     Eyes: Negative.  Negative for blurred vision.   Cardiovascular:  Negative for chest pain, irregular heartbeat, leg swelling, near-syncope, orthopnea, palpitations, paroxysmal nocturnal dyspnea and syncope.   Respiratory: Negative.  Negative for cough and shortness of breath.    Endocrine: Negative.    Hematologic/Lymphatic: Negative.    Skin: Negative.    Musculoskeletal: Negative.    Gastrointestinal: Negative.  Negative for nausea and vomiting.   Genitourinary: Negative.    Neurological:  Positive for headaches.   Psychiatric/Behavioral: Negative.  Negative for altered mental status.    Allergic/Immunologic: Negative.    Objective:     Vital Signs (Most Recent):  Temp: 98.8 °F (37.1 °C) (09/07/22 0715)  Pulse: 72 (09/07/22 1015)  Resp: (!) 21 (09/07/22 1015)  BP: 105/64 (09/07/22 1000)  SpO2: 100 % (09/07/22 1015)   Vital Signs (24h Range):  Temp:  [98.1 °F (36.7 °C)-98.8 °F (37.1 °C)] 98.8 °F (37.1 °C)  Pulse:  [] 72  Resp:  [11-39] 21  SpO2:  [64 %-100 %] 100 %  BP: ()/() 105/64     Weight: 74.5 kg (164 lb 3.9 oz)  Body mass index is 23.57 kg/m².     SpO2: 100 %  O2 Device (Oxygen Therapy): room air      Intake/Output Summary (Last 24 hours) at 9/7/2022 1034  Last data filed at 9/7/2022 0952  Gross per 24 hour   Intake 2206.27 ml   Output 815 ml   Net 1391.27 ml       Lines/Drains/Airways       Peripheral Intravenous Line  Duration                  Peripheral IV - Single Lumen 09/05/22 2335 20 G Anterior;Distal;Right Upper Arm 1 day         Peripheral IV - Single Lumen 09/06/22 0135 20 G Left Antecubital 1 day                    Physical Exam  Constitutional:       General: He is not in acute distress.     Appearance: He is not diaphoretic.   HENT:      Head: Atraumatic.   Eyes:      General:         Right eye: No discharge.         Left eye: No discharge.   Cardiovascular:      Rate and Rhythm: Normal rate  and regular rhythm.   Pulmonary:      Effort: Pulmonary effort is normal.      Breath sounds: Normal breath sounds.   Abdominal:      General: Bowel sounds are normal.      Palpations: Abdomen is soft.   Musculoskeletal:      Right lower leg: No edema.      Left lower leg: No edema.   Skin:     General: Skin is warm and dry.      Capillary Refill: Capillary refill takes less than 2 seconds.   Neurological:      Mental Status: He is alert. Mental status is at baseline.   Psychiatric:         Mood and Affect: Mood normal.         Thought Content: Thought content normal.       Significant Labs: BMP:   Recent Labs   Lab 09/05/22 2343 09/06/22 0411 09/07/22  0557   * 118* 91   * 134* 133*   K 3.7 4.1 4.6   CL 93* 98 104   CO2 32* 25 21*   BUN 24* 23 18   CREATININE 1.64* 1.5* 1.0   CALCIUM 9.1 8.8 8.3*   MG  --  2.4 2.4   , CMP   Recent Labs   Lab 09/05/22 2343 09/06/22 0411 09/07/22  0557   * 134* 133*   K 3.7 4.1 4.6   CL 93* 98 104   CO2 32* 25 21*   * 118* 91   BUN 24* 23 18   CREATININE 1.64* 1.5* 1.0   CALCIUM 9.1 8.8 8.3*   PROT 7.0 6.6 5.9*   ALBUMIN 4.2 3.4* 2.8*   BILITOT 0.9 1.0 1.1*   ALKPHOS 57 50* 48*   * 267* 250*   ALT 59* 55* 97*   ANIONGAP 7* 11 8   , CBC   Recent Labs   Lab 09/05/22 2343 09/06/22  0412   WBC 8.56 10.45   HGB 14.0 13.5*   HCT 43.3 42.4    246   , INR   Recent Labs   Lab 09/05/22 2343   INR 1.1   , Lipid Panel   Recent Labs   Lab 09/06/22  0650   CHOL 165   HDL 55   LDLCALC 99.2   TRIG 54   CHOLHDL 33.3   , Troponin   Recent Labs   Lab 09/05/22 2343 09/06/22  0412   TROPONINI 20.500* 25.859*   , and All pertinent lab results from the last 24 hours have been reviewed.    Significant Imaging: Echocardiogram: Transthoracic echo (TTE) complete (Cupid Only):   Results for orders placed or performed during the hospital encounter of 09/05/22   Echo   Result Value Ref Range    AV mean gradient 3 mmHg    Ao peak krystle 1.07 m/s    Ao VTI 18.91 cm     "IVRT 91.34 msec    IVS 1.25 (A) 0.6 - 1.1 cm    LA size 2.84 cm    Left Atrium Major Axis 4.90 cm    Left Atrium Minor Axis 5.84 cm    LVIDd 4.41 3.5 - 6.0 cm    LVIDs 3.54 2.1 - 4.0 cm    LVOT diameter 1.98 cm    LVOT peak VTI 19.49 cm    Posterior Wall 0.82 0.6 - 1.1 cm    MV Peak A Viktor 0.88 m/s    E wave deceleration time 254.36 msec    MV Peak E Viktor 0.61 m/s    PV Peak D Viktor 0.26 m/s    PV Peak S Viktor 0.33 m/s    RA Major Axis 5.33 cm    RA Width 3.92 cm    RVDD 2.84 cm    TAPSE 1.44 cm    TR Max Viktor 2.32 m/s    TDI LATERAL 0.11 m/s    TDI SEPTAL 0.06 m/s    LA WIDTH 3.50 cm    Ao root annulus 3.35 cm    AORTIC VALVE CUSP SEPERATION 2.08 cm    PV PEAK VELOCITY 0.79 cm/s    MV stenosis pressure 1/2 time 73.77 ms    LV Diastolic Volume 88.26 mL    LV Systolic Volume 52.14 mL    LVOT peak viktor 1.06 m/s    LA volume (mod) 30.09 cm3    MV "A" wave duration 14.46 msec    MV mean gradient 0 mmHg    MV peak gradient 3 mmHg    MV VTI 29.44 cm    LV LATERAL E/E' RATIO 5.55 m/s    LV SEPTAL E/E' RATIO 10.17 m/s    FS 20 %    LA volume 45.02 cm3    LV mass 155.63 g    Left Ventricle Relative Wall Thickness 0.37 cm    AV valve area 3.17 cm2    AV Velocity Ratio 0.99     AV index (prosthetic) 1.03     MV valve area p 1/2 method 2.98 cm2    MV valve area by continuity eq 2.04 cm2    E/A ratio 0.69     Mean e' 0.09 m/s    Pulm vein S/D ratio 1.27     LVOT area 3.1 cm2    LVOT stroke volume 59.98 cm3    AV peak gradient 5 mmHg    E/E' ratio 7.18 m/s    LV Systolic Volume Index 27.4 mL/m2    LV Diastolic Volume Index 46.45 mL/m2    LA Volume Index 23.7 mL/m2    LV Mass Index 82 g/m2    Triscuspid Valve Regurgitation Peak Gradient 22 mmHg    LA Volume Index (Mod) 15.8 mL/m2    BSA 1.89 m2    Right Atrial Pressure (from IVC) 3 mmHg    EF 45 %    TV rest pulmonary artery pressure 25 mmHg    Narrative    · The left ventricle is normal in size with mildly decreased systolic   function.  · The estimated ejection fraction is 45%.  · Grade " I left ventricular diastolic dysfunction.  · There are segmental left ventricular wall motion abnormalities.  · Normal right ventricular size with mildly reduced right ventricular   systolic function.  · Mild right atrial enlargement.  · Normal central venous pressure (3 mmHg).  · The estimated PA systolic pressure is 25 mmHg.

## 2022-09-08 ENCOUNTER — PATIENT OUTREACH (OUTPATIENT)
Dept: ADMINISTRATIVE | Facility: CLINIC | Age: 67
End: 2022-09-08

## 2022-09-08 NOTE — PROGRESS NOTES
C3 nurse attempted to contact Adarsh Arriola's wife Sima for a TCC post hospital discharge follow up call. No answer. The patient does not have a scheduled HOSFU appointment, and the pt does not have an [facility] PCP.

## 2022-09-08 NOTE — PROGRESS NOTES
C3 nurse attempted to contact Adarsh Arriola  for a TCC post hospital discharge follow up call. The patient's wife, Sima Arriola is unable to conduct the call @ this time. The patient requested a callback.    The patient does not have a scheduled HOSFU appointment within 5-7 days post hospital discharge date 9/7/2022. [facility].

## 2022-09-09 NOTE — PROGRESS NOTES
C3 nurse spoke with Adarsh Arriola's wife Sima for a TCC post hospital discharge follow up call. The patient's wife reports does not have a scheduled HOSFU appointment. C3 nurse was unable to schedule [facility]. Patient advised to contact their PCP to schedule a HOSPFU within 5-7 days.

## 2022-09-14 ENCOUNTER — OFFICE VISIT (OUTPATIENT)
Dept: CARDIOLOGY | Facility: CLINIC | Age: 67
End: 2022-09-14

## 2022-09-14 VITALS
BODY MASS INDEX: 23.19 KG/M2 | HEART RATE: 71 BPM | WEIGHT: 162 LBS | DIASTOLIC BLOOD PRESSURE: 62 MMHG | SYSTOLIC BLOOD PRESSURE: 118 MMHG | HEIGHT: 70 IN

## 2022-09-14 DIAGNOSIS — I50.42 CHRONIC COMBINED SYSTOLIC AND DIASTOLIC CONGESTIVE HEART FAILURE: ICD-10-CM

## 2022-09-14 DIAGNOSIS — I10 PRIMARY HYPERTENSION: ICD-10-CM

## 2022-09-14 DIAGNOSIS — I21.3 STEMI (ST ELEVATION MYOCARDIAL INFARCTION): Primary | ICD-10-CM

## 2022-09-14 DIAGNOSIS — E78.2 MIXED HYPERLIPIDEMIA: ICD-10-CM

## 2022-09-14 PROCEDURE — 99999 PR PBB SHADOW E&M-EST. PATIENT-LVL III: CPT | Mod: PBBFAC,,, | Performed by: INTERNAL MEDICINE

## 2022-09-14 PROCEDURE — 99215 OFFICE O/P EST HI 40 MIN: CPT | Mod: S$PBB,,, | Performed by: INTERNAL MEDICINE

## 2022-09-14 PROCEDURE — 99213 OFFICE O/P EST LOW 20 MIN: CPT | Mod: PBBFAC,PO | Performed by: INTERNAL MEDICINE

## 2022-09-14 PROCEDURE — 99999 PR PBB SHADOW E&M-EST. PATIENT-LVL III: ICD-10-PCS | Mod: PBBFAC,,, | Performed by: INTERNAL MEDICINE

## 2022-09-14 PROCEDURE — 99215 PR OFFICE/OUTPT VISIT, EST, LEVL V, 40-54 MIN: ICD-10-PCS | Mod: S$PBB,,, | Performed by: INTERNAL MEDICINE

## 2022-09-14 NOTE — PROGRESS NOTES
Subjective:   Patient ID:  Adarsh Arriola is a 67 y.o. male who presents for  of Coronary Artery Disease, Congestive Heart Failure, and Hypertension      HPI:       Adarsh Arriola 67 y.o. male is here follow up and feeling well without any new complaints. He was admitted for inferior STEMI. He presented at least 48 hours after his presentation. He had RV involvement and recovered well. He is tolerating his medication well. Echo revealed an EF 45%.            S/p LHC via right radial 2022                    Patent LM, LAD, and LCX              Prox RCA occlusion (culprit)              LVEDP 5 mmHg                    Intervention:                 IVUS guided PCI of RCA with 3.5 x 30 mm Resolute KILO              Post dilated with 4.0 NC balloon           Patient Active Problem List    Diagnosis Date Noted    Chronic combined systolic and diastolic congestive heart failure 2022    Acute right ventricular myocardial infarction 2022    Acute systolic heart failure 2022    STEMI (ST elevation myocardial infarction) 2022    Vomiting 2022    Elevated troponin 2022    Primary hypertension 2022    Chronic renal failure, stage 3 (moderate) 2022    Mixed hyperlipidemia 2022    Acute coronary syndrome 2022    Coronary artery disease 2022           Right Arm BP - Sittin/80  Left Arm BP - Sittin/62        LABS    LAST HbA1c  Lab Results   Component Value Date    HGBA1C 5.4 2022       Lipid panel  Lab Results   Component Value Date    CHOL 165 2022     Lab Results   Component Value Date    HDL 55 2022     Lab Results   Component Value Date    LDLCALC 99.2 2022     Lab Results   Component Value Date    TRIG 54 2022     Lab Results   Component Value Date    CHOLHDL 33.3 2022            Review of Systems   Constitutional: Negative for diaphoresis, night sweats, weight gain and weight loss.   HENT:  Negative for  congestion.    Eyes:  Negative for blurred vision, discharge and double vision.   Cardiovascular:  Negative for chest pain, claudication, cyanosis, dyspnea on exertion, irregular heartbeat, leg swelling, near-syncope, orthopnea, palpitations, paroxysmal nocturnal dyspnea and syncope.   Respiratory:  Negative for cough, shortness of breath and wheezing.    Endocrine: Negative for cold intolerance, heat intolerance and polyphagia.   Hematologic/Lymphatic: Negative for adenopathy and bleeding problem. Does not bruise/bleed easily.   Skin:  Negative for dry skin and nail changes.   Musculoskeletal:  Negative for arthritis, back pain, falls, joint pain, myalgias and neck pain.   Gastrointestinal:  Negative for bloating, abdominal pain, change in bowel habit and constipation.   Genitourinary:  Negative for bladder incontinence, dysuria, flank pain, genital sores and missed menses.   Neurological:  Negative for aphonia, brief paralysis, difficulty with concentration, dizziness and weakness.   Psychiatric/Behavioral:  Negative for altered mental status and memory loss. The patient does not have insomnia.    Allergic/Immunologic: Negative for environmental allergies.     Objective:   Physical Exam  Constitutional:       Appearance: He is well-developed.      Interventions: He is not intubated.  HENT:      Head: Normocephalic and atraumatic.      Right Ear: External ear normal.      Left Ear: External ear normal.   Eyes:      General: No scleral icterus.        Right eye: No discharge.         Left eye: No discharge.      Conjunctiva/sclera: Conjunctivae normal.      Pupils: Pupils are equal, round, and reactive to light.   Neck:      Thyroid: No thyromegaly.      Vascular: Normal carotid pulses. No carotid bruit, hepatojugular reflux or JVD.      Trachea: No tracheal deviation.   Cardiovascular:      Rate and Rhythm: Normal rate and regular rhythm. No extrasystoles are present.     Chest Wall: PMI is not displaced.       Pulses:           Carotid pulses are 2+ on the right side and 2+ on the left side.       Radial pulses are 2+ on the right side and 2+ on the left side.        Femoral pulses are 2+ on the right side and 2+ on the left side.       Popliteal pulses are 2+ on the right side and 2+ on the left side.        Dorsalis pedis pulses are 2+ on the right side and 2+ on the left side.        Posterior tibial pulses are 2+ on the right side and 2+ on the left side.      Heart sounds: S1 normal and S2 normal. Heart sounds not distant. No midsystolic click. No murmur heard.    No friction rub. No gallop. No S3 sounds.   Pulmonary:      Effort: Pulmonary effort is normal. No tachypnea, bradypnea, accessory muscle usage or respiratory distress. He is not intubated.      Breath sounds: Normal breath sounds. No stridor. No decreased breath sounds, wheezing or rales.   Chest:      Chest wall: No tenderness.   Abdominal:      General: There is no distension or abdominal bruit.      Palpations: There is no mass or pulsatile mass.      Tenderness: There is no abdominal tenderness. There is no guarding or rebound.   Musculoskeletal:         General: No tenderness. Normal range of motion.      Cervical back: Normal range of motion and neck supple.   Lymphadenopathy:      Cervical: No cervical adenopathy.   Skin:     General: Skin is warm.      Coloration: Skin is not pale.      Findings: No erythema or rash.   Neurological:      Mental Status: He is alert and oriented to person, place, and time.      Cranial Nerves: No cranial nerve deficit.      Coordination: Coordination normal.      Deep Tendon Reflexes: Reflexes are normal and symmetric.   Psychiatric:         Behavior: Behavior normal.         Thought Content: Thought content normal.         Judgment: Judgment normal.       Assessment:     1. STEMI (ST elevation myocardial infarction)    2. Primary hypertension    3. Mixed hyperlipidemia    4. Chronic combined systolic and diastolic  congestive heart failure        Plan:     DAPT with aspirin + brilinta   Intense statin therapy  GDMT for CAD + CHF   Cardiac rehab II   Follow in echo in 3 to 4 months with echo + labs         Continue with current medical plan and lifestyle changes.  Return sooner for concerns or questions. If symptoms persist go to the ED  I have reviewed all pertinent data on this patient       I have reviewed the patient's medical history in detail and updated the computerized patient record.    Orders Placed This Encounter   Procedures    CBC Auto Differential     Standing Status:   Future     Standing Expiration Date:   3/14/2024    Comprehensive Metabolic Panel     Standing Status:   Future     Standing Expiration Date:   3/14/2024    Lipid Panel     Standing Status:   Future     Standing Expiration Date:   3/14/2024    Cardiac Rehab Phase II     Standing Status:   Future     Standing Expiration Date:   9/14/2023     Order Specific Question:   Department     Answer:   [facility] CARDIAC REHAB     Order Specific Question:   Select qualifying diagnosis:     Answer:   I21.11 - ST elevation (STEMI) myocardial infarction involving right coronary artery    Echo     Standing Status:   Future     Standing Expiration Date:   9/14/2023     Order Specific Question:   Release to patient     Answer:   Immediate       Follow up as scheduled. Return sooner for concerns or questions            He expressed verbal understanding and agreed with the plan        Patient's Medications   New Prescriptions    No medications on file   Previous Medications    ASPIRIN (ECOTRIN) 81 MG EC TABLET    Take 1 tablet (81 mg total) by mouth once daily.    ATORVASTATIN (LIPITOR) 80 MG TABLET    Take 1 tablet (80 mg total) by mouth once daily.    LISINOPRIL 10 MG TABLET    Take 1 tablet (10 mg total) by mouth once daily.    METOPROLOL SUCCINATE (TOPROL-XL) 25 MG 24 HR TABLET    Take 1 tablet (25 mg total) by mouth once daily.    TICAGRELOR (BRILINTA) 90 MG TABLET     Take 1 tablet (90 mg total) by mouth 2 (two) times daily.   Modified Medications    No medications on file   Discontinued Medications    No medications on file

## 2022-10-06 ENCOUNTER — TELEPHONE (OUTPATIENT)
Dept: PHARMACY | Facility: CLINIC | Age: 67
End: 2022-10-06

## 2022-10-06 NOTE — TELEPHONE ENCOUNTER
I have reached out to Adarsh Arriola to inform him of the [employer] application process for Brilinta and whats required to apply.  Adarsh Arriola did not answer. I left a voicemail and mailed a letter introducing him to the pharmacy patient assistance program. I will follow up in 5 business days.

## 2022-10-07 NOTE — TELEPHONE ENCOUNTER
"I have spoken with Adarsh Arriola and informed her of the [employer] application process for Brilinta and what's required to apply.  Adarsh Arriola will provide the following documents:           "Copy of patient's current Insurance Card and Medicare Card (Red, White & Blue) front & back  "Proof of income (Social Security Award Letter or a Month of Check Stubs if employed or Tax Return)  "Print out from your Pharmacy and / or insurance company EOB that shows your out-of-pocket expense (copays spent for current year)      I will follow up with the patient in 5 business days.    "

## 2022-12-12 PROBLEM — I21.3 STEMI (ST ELEVATION MYOCARDIAL INFARCTION): Status: RESOLVED | Noted: 2022-09-06 | Resolved: 2022-12-12

## 2022-12-12 PROBLEM — I21.9: Status: RESOLVED | Noted: 2022-09-07 | Resolved: 2022-12-12

## 2022-12-24 ENCOUNTER — HOSPITAL ENCOUNTER (EMERGENCY)
Facility: HOSPITAL | Age: 67
Discharge: HOME | End: 2022-12-24
Attending: EMERGENCY MEDICINE

## 2022-12-24 VITALS
TEMPERATURE: 98 F | DIASTOLIC BLOOD PRESSURE: 91 MMHG | SYSTOLIC BLOOD PRESSURE: 170 MMHG | BODY MASS INDEX: 22.96 KG/M2 | OXYGEN SATURATION: 99 % | HEART RATE: 75 BPM | RESPIRATION RATE: 18 BRPM | WEIGHT: 160 LBS

## 2022-12-24 DIAGNOSIS — R10.11 BILATERAL UPPER ABDOMINAL DISCOMFORT: ICD-10-CM

## 2022-12-24 DIAGNOSIS — R11.2 NAUSEA AND VOMITING, UNSPECIFIED VOMITING TYPE: ICD-10-CM

## 2022-12-24 DIAGNOSIS — R10.12 BILATERAL UPPER ABDOMINAL DISCOMFORT: ICD-10-CM

## 2022-12-24 DIAGNOSIS — R11.0 NAUSEA: Primary | ICD-10-CM

## 2022-12-24 DIAGNOSIS — U07.1 COVID: ICD-10-CM

## 2022-12-24 LAB
ALBUMIN SERPL BCP-MCNC: 4 G/DL (ref 3.5–5.2)
ALP SERPL-CCNC: 84 U/L (ref 55–135)
ALT SERPL W/O P-5'-P-CCNC: 33 U/L (ref 10–44)
ANION GAP SERPL CALC-SCNC: 10 MMOL/L (ref 8–16)
AST SERPL-CCNC: 28 U/L (ref 10–40)
BASOPHILS # BLD AUTO: 0.01 K/UL (ref 0–0.2)
BASOPHILS NFR BLD: 0.1 % (ref 0–1.9)
BILIRUB SERPL-MCNC: 0.7 MG/DL (ref 0.1–1)
BNP SERPL-MCNC: 37 PG/ML (ref 0–99)
BUN SERPL-MCNC: 8 MG/DL (ref 8–23)
CALCIUM SERPL-MCNC: 9.7 MG/DL (ref 8.7–10.5)
CHLORIDE SERPL-SCNC: 102 MMOL/L (ref 95–110)
CO2 SERPL-SCNC: 27 MMOL/L (ref 23–29)
CREAT SERPL-MCNC: 1 MG/DL (ref 0.5–1.4)
DIFFERENTIAL METHOD: ABNORMAL
EOSINOPHIL # BLD AUTO: 0 K/UL (ref 0–0.5)
EOSINOPHIL NFR BLD: 0.1 % (ref 0–8)
ERYTHROCYTE [DISTWIDTH] IN BLOOD BY AUTOMATED COUNT: 14.3 % (ref 11.5–14.5)
EST. GFR  (NO RACE VARIABLE): >60 ML/MIN/1.73 M^2
GLUCOSE SERPL-MCNC: 107 MG/DL (ref 70–110)
HCT VFR BLD AUTO: 45.9 % (ref 40–54)
HGB BLD-MCNC: 14.6 G/DL (ref 14–18)
IMM GRANULOCYTES # BLD AUTO: 0.01 K/UL (ref 0–0.04)
IMM GRANULOCYTES NFR BLD AUTO: 0.1 % (ref 0–0.5)
LIPASE SERPL-CCNC: 9 U/L (ref 4–60)
LYMPHOCYTES # BLD AUTO: 0.6 K/UL (ref 1–4.8)
LYMPHOCYTES NFR BLD: 8.9 % (ref 18–48)
MCH RBC QN AUTO: 26.9 PG (ref 27–31)
MCHC RBC AUTO-ENTMCNC: 31.8 G/DL (ref 32–36)
MCV RBC AUTO: 85 FL (ref 82–98)
MONOCYTES # BLD AUTO: 0.5 K/UL (ref 0.3–1)
MONOCYTES NFR BLD: 7.1 % (ref 4–15)
NEUTROPHILS # BLD AUTO: 5.9 K/UL (ref 1.8–7.7)
NEUTROPHILS NFR BLD: 83.7 % (ref 38–73)
NRBC BLD-RTO: 0 /100 WBC
PLATELET # BLD AUTO: 212 K/UL (ref 150–450)
PMV BLD AUTO: 9.9 FL (ref 9.2–12.9)
POTASSIUM SERPL-SCNC: 4 MMOL/L (ref 3.5–5.1)
PROT SERPL-MCNC: 7.7 G/DL (ref 6–8.4)
RBC # BLD AUTO: 5.43 M/UL (ref 4.6–6.2)
SARS-COV-2 RDRP RESP QL NAA+PROBE: POSITIVE
SODIUM SERPL-SCNC: 139 MMOL/L (ref 136–145)
TROPONIN I SERPL DL<=0.01 NG/ML-MCNC: 0.02 NG/ML (ref 0–0.03)
TROPONIN I SERPL DL<=0.01 NG/ML-MCNC: 0.02 NG/ML (ref 0–0.03)
WBC # BLD AUTO: 7.09 K/UL (ref 3.9–12.7)

## 2022-12-24 PROCEDURE — 84484 ASSAY OF TROPONIN QUANT: CPT | Performed by: EMERGENCY MEDICINE

## 2022-12-24 PROCEDURE — 83880 ASSAY OF NATRIURETIC PEPTIDE: CPT | Performed by: EMERGENCY MEDICINE

## 2022-12-24 PROCEDURE — U0002 COVID-19 LAB TEST NON-CDC: HCPCS | Performed by: EMERGENCY MEDICINE

## 2022-12-24 PROCEDURE — 93010 EKG 12-LEAD: ICD-10-PCS | Mod: ,,, | Performed by: INTERNAL MEDICINE

## 2022-12-24 PROCEDURE — 85025 COMPLETE CBC W/AUTO DIFF WBC: CPT | Performed by: EMERGENCY MEDICINE

## 2022-12-24 PROCEDURE — 93005 ELECTROCARDIOGRAM TRACING: CPT

## 2022-12-24 PROCEDURE — 83690 ASSAY OF LIPASE: CPT | Performed by: EMERGENCY MEDICINE

## 2022-12-24 PROCEDURE — 99285 EMERGENCY DEPT VISIT HI MDM: CPT | Mod: 25

## 2022-12-24 PROCEDURE — 93010 ELECTROCARDIOGRAM REPORT: CPT | Mod: ,,, | Performed by: INTERNAL MEDICINE

## 2022-12-24 PROCEDURE — 80053 COMPREHEN METABOLIC PANEL: CPT | Performed by: EMERGENCY MEDICINE

## 2022-12-24 RX ORDER — ASPIRIN 325 MG
325 TABLET, DELAYED RELEASE (ENTERIC COATED) ORAL
Status: DISCONTINUED | OUTPATIENT
Start: 2022-12-24 | End: 2022-12-25 | Stop reason: HOSPADM

## 2022-12-24 RX ORDER — ONDANSETRON 4 MG/1
4 TABLET, ORALLY DISINTEGRATING ORAL EVERY 8 HOURS PRN
Qty: 12 TABLET | Refills: 0 | Status: SHIPPED | OUTPATIENT
Start: 2022-12-24

## 2022-12-24 RX ORDER — IPRATROPIUM BROMIDE 42 UG/1
2 SPRAY, METERED NASAL 4 TIMES DAILY PRN
Qty: 15 ML | Refills: 0 | Status: SHIPPED | OUTPATIENT
Start: 2022-12-24

## 2022-12-24 NOTE — ED NOTES
"Patient presents to ED from home and states he was concerned this morning about his cardiac stent that was placed this labor day. Patient states he had eaten breakfast and felt like something might have been "stuck" in his chest along with have nausea and vomiting. Patient states that since then he has had several bowel movements and is feeling much better now, but still wants to get checked out. Patient is AAOx4 and ambulatory @ baseline. Denies chest pain or SOB. Denies nausea or vomiting at this time.   "

## 2022-12-24 NOTE — ED PROVIDER NOTES
Encounter Date: 12/24/2022       History     Chief Complaint   Patient presents with    Nausea     Pt c/o nausea and epigastric burning. Pt has had some nasal and chest congestion. Pt denies shortness of breath. Pt denies any chest pain.     Adarsh Arriola is a 67 y.o. male who  has a past medical history of Hypertension.    The patient presents to the ED due abdominal discomfort.  Patient states his symptoms started this morning he never really had pain but states that he had 1 episode of vomiting and feeling like his food got stuck in stomach. Triage note reviewed, however he does not report  ever having pain with this to me.  He had 3 bowel movements and states he feels much better and has no concerns at this time.  He reports he had a heart attack in September and was concerned about his symptoms being related to his heart.  He reports no chest pain no sweating no stomach pain vomiting or diarrhea at this time.  He does have a cough and runny nose and his wife is being evaluated here as well for COVID and chest pain. He is back to his normal state of health and reports compliance with his medication regimen. He has not followed up yet with his cardiologist.       Review of patient's allergies indicates:  No Known Allergies  Past Medical History:   Diagnosis Date    Hypertension      Past Surgical History:   Procedure Laterality Date    CORONARY ANGIOGRAPHY N/A 9/6/2022    Procedure: ANGIOGRAM, CORONARY ARTERY;  Surgeon: Alberto Gilmore MD;  Location: [facility] CATH LAB/EP;  Service: Cardiology;  Laterality: N/A;    LEFT HEART CATHETERIZATION Left 9/6/2022    Procedure: CATHETERIZATION, HEART, LEFT;  Surgeon: Alberto Gilmore MD;  Location: [facility] CATH LAB/EP;  Service: Cardiology;  Laterality: Left;     No family history on file.  Social History     Tobacco Use    Smoking status: Never    Smokeless tobacco: Never   Substance Use Topics    Alcohol use: Never    Drug use: Never     Review of Systems   Constitutional:   Negative for fever.   HENT:  Negative for sore throat.    Respiratory:  Negative for shortness of breath.    Gastrointestinal:  Positive for abdominal pain and nausea.   Genitourinary:  Negative for dysuria.   Musculoskeletal:  Negative for back pain.   Skin:  Negative for rash.   Neurological:  Negative for weakness.   Hematological:  Does not bruise/bleed easily.     Physical Exam     Initial Vitals   BP Pulse Resp Temp SpO2   12/24/22 1523 12/24/22 1523 12/24/22 1523 12/24/22 1523 12/24/22 1524   (!) 172/85 86 18 97.9 °F (36.6 °C) 98 %      MAP       --                Physical Exam    Nursing note and vitals reviewed.  Constitutional: He appears well-developed and well-nourished. He is not diaphoretic. No distress.   HENT:   Head: Normocephalic and atraumatic.   Mouth/Throat: Oropharynx is clear and moist.   Eyes: EOM are normal. Pupils are equal, round, and reactive to light.   Neck: No tracheal deviation present.   Cardiovascular:  Normal rate, regular rhythm, normal heart sounds and intact distal pulses.           Pulmonary/Chest: Breath sounds normal. No stridor. No respiratory distress.   Abdominal: Abdomen is soft. He exhibits no distension and no mass. There is no abdominal tenderness.   Musculoskeletal:         General: No edema. Normal range of motion.     Neurological: He is alert and oriented to person, place, and time. No cranial nerve deficit or sensory deficit.   Skin: Skin is warm and dry. Capillary refill takes less than 2 seconds. No rash noted.   Psychiatric: He has a normal mood and affect. His behavior is normal. Thought content normal.       ED Course   Procedures  Labs Reviewed   CBC W/ AUTO DIFFERENTIAL - Abnormal; Notable for the following components:       Result Value    MCH 26.9 (*)     MCHC 31.8 (*)     Lymph # 0.6 (*)     Gran % 83.7 (*)     Lymph % 8.9 (*)     All other components within normal limits   SARS-COV-2 RNA AMPLIFICATION, QUAL - Abnormal; Notable for the following  components:    SARS-CoV-2 RNA, Amplification, Qual Positive (*)     All other components within normal limits    Narrative:     [facility]   critical result(s) called and verbal readback obtained from   Clovis Adkins RN by STEPHY 12/24/2022 21:16   COMPREHENSIVE METABOLIC PANEL   TROPONIN I   TROPONIN I   B-TYPE NATRIURETIC PEPTIDE   LIPASE          Imaging Results              X-Ray Chest 1 View (Final result)  Result time 12/24/22 17:12:08      Final result by Reji Wilson MD (12/24/22 17:12:08)                   Impression:      1. No acute cardiopulmonary process.      Electronically signed by: Reji Wilson MD  Date:    12/24/2022  Time:    17:12               Narrative:    EXAMINATION:  XR CHEST 1 VIEW    CLINICAL HISTORY:  Chest pain, unspecified    TECHNIQUE:  Single frontal view of the chest was performed.    COMPARISON:  09/05/2022    FINDINGS:  The cardiomediastinal silhouette is not enlarged.  There is no pleural effusion.  The trachea is midline.  The lungs are symmetrically expanded bilaterally without evidence of acute parenchymal process. No large focal consolidation seen.  There is no pneumothorax.  The osseous structures are unremarkable.                                       Medications - No data to display  Medical Decision Making:   Initial Assessment:   Patient is a pleasant 67-year-old man presenting with abdominal discomfort with associated vomiting and  resolved after BM. Cital signs are stable.  His physical exam is reassuring.  He has no pain at this time.  Will plan to obtain labs serial troponins, reassess.  Differential Diagnosis:   Differential Diagnosis includes, but is not limited to:  AAA, aortic dissection, mesenteric ischemia, perforated viscous, MI/ACS, SBO/volvulus, incarcerated/strangulated hernia, intussusception, ileus, appendicitis, cholecystitis, cholangitis, diverticulitis, esophagitis, hepatitis, nephrolithiasis, pancreatitis, gastroenteritis, colitis, IBD/IBS, biliary  colic, GERD, PUD, constipation, UTI/pyelonephritis,  disorder.    ED Management:  Patient was observed in the ED without acute event.  Labs other than being COVID positive without significant findings inpatient appears well on exam.  His troponins are negative x2.  He is had no further episodes of pain since being here. Vomiting/ GI symptoms may be due to covid. While I think ACS is very unlikely today given his risk factors and heart catheterization however this year I discussed admission for further further evaluation of his symptoms.  Patient arrived home.  I think this is reasonable and encouraged to follow-up with his cardiologist - I placed referral.  No emergent process was identified today, he appears stable for discharge with treatment for COVID and follow up with cardiology.  We discussed return precautions for worsening/recurrent symptoms or any new symptoms of concern.    After taking into careful account the historical factors and physical exam findings of the patient's presentation today, in conjunction with the empirical and objective data obtained on ED workup, no acute emergent medical condition has been identified. The patient appears to be low risk for an emergent medical condition and I feel it is safe and appropriate at this time for the patient to be discharged to follow-up as detailed in their discharge instructions for reevaluation and possible continued outpatient workup and management. I have discussed the specifics of the workup with the patient and the patient has verbalized understanding of the details of the workup, the diagnosis, the treatment plan, and the need for outpatient follow-up.  Although the patient has no emergent etiology today this does not preclude the development of an emergent condition so in addition, I have advised the patient that they can return to the ED and/or activate EMS at any time with worsening of their symptoms, change of their symptoms, or with any other  medical complaint.  The patient remained comfortable and stable during their visit in the ED.  Discharge and follow-up instructions discussed with the patient who expressed understanding and willingness to comply with my recommendations.             ED Course as of 12/26/22 0748   Sat Dec 24, 2022   1806 EKG:  Rate 73.  Normal sinus rhythm.  T-wave inversions in leads 3 and AVF.  No STEMI. [RN]   1915 CBC auto differential(!)  Unremarkable [RN]   1915 Comprehensive metabolic panel  Negative [RN]   1915 Troponin I #1  Negative [RN]   1916 Lipase  Negative [RN]   1916 BNP  Negative [RN]   1916 X-Ray Chest 1 View  No pneumonia or pneumothorax or effusion remind dependent interpretation [RN]   2144 Patient's serial troponins are flat.  He is COVID positive.  On reassessment he reports feeling better.  No further episodes of chest discomfort nausea or any complaints.  As patient is currently on Lipitor and taking Brilinta will place referral for him to receive antibody infusion for COVID.  Recommend he follow-up closely with his PCP, Cardiology.  Return precautions discussed for worsening symptoms or any [RN]      ED Course User Index  [RN] Rayray Cruz Jr., MD                   Clinical Impression:   Final diagnoses:  [R11.0] Nausea (Primary)  [R10.11, R10.12] Bilateral upper abdominal discomfort  [R11.2] Nausea and vomiting, unspecified vomiting type  [U07.1] COVID        ED Disposition Condition    Discharge Stable          ED Prescriptions       Medication Sig Dispense Start Date End Date Auth. Provider    ondansetron (ZOFRAN-ODT) 4 MG TbDL Take 1 tablet (4 mg total) by mouth every 8 (eight) hours as needed. 12 tablet 12/24/2022 -- Rayray Cruz Jr., MD    ipratropium (ATROVENT) 42 mcg (0.06 %) nasal spray 2 sprays by Each Nostril route 4 (four) times daily as needed for Rhinitis. 15 mL 12/24/2022 -- Rayray Cruz Jr., MD          Follow-up Information       Follow up With Specialties Details Why Contact  Info Additional Information    Gamaliel Friedman MD Family Medicine In 3 days  [address]  [address]  [place] [address]  [phone]       [place] [facility]   [address] [facility] [address]  [facility] [address] [phone] Please park in [address] or [address] and use Jose jimenes.          Portions of this note were dictated using voice recognition software and may contain dictation related errors in spelling/grammar/syntax not found on text review       Rayray Cruz Jr., MD  12/26/22 0759

## 2022-12-25 DIAGNOSIS — U07.1 COVID-19 VIRUS DETECTED: ICD-10-CM

## 2022-12-25 NOTE — ED NOTES
"Patient refused to take 325 mg aspirin EC because he states "ecotrine" makes him hallucinate. Informed MD. Instructed patient to take his home dose 81 mg joanie asa instead.   "

## 2022-12-25 NOTE — DISCHARGE INSTRUCTIONS
If you have a COVID Test PENDING:  Please access [facility] to review the results of your test. Until the results of your COVID test return, you should isolate yourself so as not to potentially spread illness to others.   If your COVID test returns positive, you should isolate yourself so as not to spread illness to others. After five full days, if you are feeling better and you have not had fever for 24 hours, you can return to your typical daily activities, but you must wear a mask around others for an additional 5 days.   If your COVID test returns negative and you are either unvaccinated or more than six months out from your two-dose vaccine and are not yet boosted, you should still quarantine for 5 full days followed by strict mask use for an additional 5 full days.   If your COVID test returns negative and you have received your 2-dose initial vaccine as well as a booster, you should continue strict mask use for 10 full days after the exposure.  For all those exposed, best practice includes a test at day 5 after the exposure. This can be a home test or a test through one of the many testing centers throughout our community.   Masking is always advised to limit the spread of COVID. Cdc.gov is an excellent site to obtain the latest up to date recommendations regarding COVID and COVID testing.     After your evaluation today, we ruled out any emergent condition. However, if you develop shortness of breath, chest pain, or ANY OTHER CONCERNS please return to the emergency department for further care.      CDC Testing and Quarantine Guidelines for patients with exposure to a known-positive COVID-19 person:  A close exposure is defined as anyone who has had an exposure (masked or unmasked) to a known COVID -19 positive person within 6 feet of someone for a cumulative total of 15 minutes or more over a 24-hour period.   Vaccinated and/or if you recently had a positive covid test within 90 days do NOT need to  quarantine after contact with someone who had COVID-19 unless you develop symptoms.   Fully vaccinated people who have not had a positive test within 90 days, should get tested 3-5 days after their exposure, even if they don't have symptoms and wear a mask indoors in public for 14 days following exposure or until their test result is negative.      Unvaccinated and/or NOT had a positive test within 90 days and meet close exposure  You are required by CDC guidelines to quarantine for at least 5 days from time of exposure followed by 5 days of strict masking. It is recommended, but not required to test after 5 days, unless you develop symptoms, in which case you should test at that time.  If you get tested after 5 days and your test is positive, your 5 day period of isolation starts the day of the positive test.    If your exposure does not meet the above definition, you can return to your normal daily activities to include social distancing, wearing a mask and frequent handwashing.      Here is a link to guidance from the CDC:  https://www.cdc.gov/media/releases/2021/s1227-isolation-quarantine-guidance.html      [facility] Testing Sites:  https://ldh.[place].gov/page/3934      [facility] website with testing locations and guidance:  [email or web address]://[email or web address]

## 2023-01-18 ENCOUNTER — TELEPHONE (OUTPATIENT)
Dept: ADMINISTRATIVE | Facility: OTHER | Age: 68
End: 2023-01-18

## (undated) DEVICE — CATH GUIDE LINER  V3 6F

## (undated) DEVICE — CATH EAGLE EYE ST .014X20X150

## (undated) DEVICE — GUIDE WIRE BMW 014 X190

## (undated) DEVICE — CATH EXPO FL4 6F

## (undated) DEVICE — VISE RADIFOCUS MULTI TORQUE

## (undated) DEVICE — GUIDE LAUNCHER 6FR AR 1.0

## (undated) DEVICE — Device

## (undated) DEVICE — KIT INTRODUCER W/GUIDEWIRE

## (undated) DEVICE — DRAPE ANGIO BRACH 38X44IN

## (undated) DEVICE — KIT GLIDESHEATH SLEND 6FR 10CM

## (undated) DEVICE — TUBING HPCIL ROT M/F ADPT 48IN

## (undated) DEVICE — VALVE CONTROL COPILOT

## (undated) DEVICE — CATH EMERGE MR 15 X 2.50

## (undated) DEVICE — CATH NC QUANTUM APEX MR 4X15

## (undated) DEVICE — KIT LEFT HEART MANIFOLD CUSTOM

## (undated) DEVICE — CONTRAST VISIPAQUE 150ML

## (undated) DEVICE — GUIDE LAUNCHER 6FR JR 4.0

## (undated) DEVICE — SYR MED RAD 150ML

## (undated) DEVICE — CATH PRONTO LP ROUND 6FR 1.5MM

## (undated) DEVICE — INFLATOR ENCORE 26 BLLN INFL

## (undated) DEVICE — PAD DEFIB CADENCE ADULT R2

## (undated) DEVICE — CATH ANG PIGTAIL 4FR INFINITY

## (undated) DEVICE — HEMOSTAT VASC BAND REG 24CM

## (undated) DEVICE — COVER PROBE US 5.5X58L NON LTX

## (undated) DEVICE — CATH EMERGE MR 15 X 3.00